# Patient Record
Sex: MALE | Race: WHITE
[De-identification: names, ages, dates, MRNs, and addresses within clinical notes are randomized per-mention and may not be internally consistent; named-entity substitution may affect disease eponyms.]

---

## 2018-08-24 ENCOUNTER — HOSPITAL ENCOUNTER (EMERGENCY)
Dept: HOSPITAL 41 - JD.ED | Age: 78
Discharge: HOME | End: 2018-08-24
Payer: MEDICARE

## 2018-08-24 DIAGNOSIS — F02.80: ICD-10-CM

## 2018-08-24 DIAGNOSIS — Z79.899: ICD-10-CM

## 2018-08-24 DIAGNOSIS — E03.9: ICD-10-CM

## 2018-08-24 DIAGNOSIS — I50.32: ICD-10-CM

## 2018-08-24 DIAGNOSIS — G31.83: ICD-10-CM

## 2018-08-24 DIAGNOSIS — R41.0: Primary | ICD-10-CM

## 2018-08-24 DIAGNOSIS — T43.595A: ICD-10-CM

## 2018-08-24 DIAGNOSIS — Z79.01: ICD-10-CM

## 2018-08-24 PROCEDURE — 70450 CT HEAD/BRAIN W/O DYE: CPT

## 2018-08-24 PROCEDURE — 80053 COMPREHEN METABOLIC PANEL: CPT

## 2018-08-24 PROCEDURE — 99285 EMERGENCY DEPT VISIT HI MDM: CPT

## 2018-08-24 PROCEDURE — 85610 PROTHROMBIN TIME: CPT

## 2018-08-24 PROCEDURE — 85027 COMPLETE CBC AUTOMATED: CPT

## 2018-08-24 PROCEDURE — 84484 ASSAY OF TROPONIN QUANT: CPT

## 2018-08-24 PROCEDURE — 85652 RBC SED RATE AUTOMATED: CPT

## 2018-08-24 PROCEDURE — 96361 HYDRATE IV INFUSION ADD-ON: CPT

## 2018-08-24 PROCEDURE — 82553 CREATINE MB FRACTION: CPT

## 2018-08-24 PROCEDURE — 81001 URINALYSIS AUTO W/SCOPE: CPT

## 2018-08-24 PROCEDURE — 83880 ASSAY OF NATRIURETIC PEPTIDE: CPT

## 2018-08-24 PROCEDURE — 84443 ASSAY THYROID STIM HORMONE: CPT

## 2018-08-24 PROCEDURE — 85007 BL SMEAR W/DIFF WBC COUNT: CPT

## 2018-08-24 PROCEDURE — 71045 X-RAY EXAM CHEST 1 VIEW: CPT

## 2018-08-24 PROCEDURE — 83735 ASSAY OF MAGNESIUM: CPT

## 2018-08-24 PROCEDURE — 87040 BLOOD CULTURE FOR BACTERIA: CPT

## 2018-08-24 PROCEDURE — 36415 COLL VENOUS BLD VENIPUNCTURE: CPT

## 2018-08-24 PROCEDURE — 96374 THER/PROPH/DIAG INJ IV PUSH: CPT

## 2018-08-24 PROCEDURE — 83605 ASSAY OF LACTIC ACID: CPT

## 2018-08-24 PROCEDURE — 86140 C-REACTIVE PROTEIN: CPT

## 2018-08-24 NOTE — CT
Head CT

 

Technique: Multiple axial sections through the brain were obtained.  

Intravenous contrast was not utilized.

 

Comparison: No prior intracranial imaging.

 

Findings: Ventricles along with basal cisterns and sulci over the 

convexities are moderately prominent.  Minimal diminished density is 

noted within the periventricular white matter which is felt compatible

 with small vessel ischemic demyelination change.  Atrophy is also 

noted within the cerebellum.  No other abnormal parenchymal densities 

are seen.  No evidence of intracranial hemorrhage.  No midline shift 

or mass effect is seen.

 

Bone window settings were obtained which shows moderate mucosal 

thickening left maxillary sinus and mild areas of mucosal thickening 

within the ethmoid sinuses.  No acute calvarial abnormality is seen.

 

Impression:

1.  Sinus findings which likely represent mild chronic sinusitis.

2.  Generalized atrophy as noted above.  Nothing acute is appreciated 

on noncontrast head CT exam.

 

Diagnostic code #2

## 2018-08-24 NOTE — EDM.PDOC
ED HPI GENERAL MEDICAL PROBLEM





- General


Chief Complaint: General


Stated Complaint: WEAKNESS/INCREASED CONFUSION


Time Seen by Provider: 08/24/18 07:27


Source of Information: Reports: Family


History Limitations: Reports: No Limitations





- History of Present Illness


INITIAL COMMENTS - FREE TEXT/NARRATIVE: 


77-year-old male brought to the ED by his wife and son this morning. They have 

appreciated increased generalized weakness and confusion over the last several 

weeks but much worse over the last week. Patient did have a tooth resected are 

excised earlier this week but was not put on any pain medication or 

antibiotics. Seemed to tolerate the procedure quite well and has been eating 

and drinking okay. They've appreciated him expressing declining in cognitive 

cognitive function and mobility over the last 2 months.  is been diagnosed 

with Lewy body dementia about 5 years ago. He has intermittent incontinence of 

stool and urine. Weight has been for the most part stable. Denies cough or 

sputum production. They believe he is taking adequate nutrition.


Onset: Gradual, Unknown/Unsure (Over the last 2 months but worse the last 3-4 

days.)


Duration: Week(s):, Chronic


Location: Reports: Generalized


Quality: Reports: Other (Generalized decline in cognitive function and seems to 

be more confused as of late particular last 3-4 days. Infusion disorientation)


Severity: Moderate (generalized weakness)


Improves with: Reports: None


Worsens with: Reports: None


Context: Reports: Other (Ration has Lewy body dementia. He had a tooth 

extracted 2 days ago).  Denies: Activity, Exercise, Lifting, Sick Contact, 

Trauma


Associated Symptoms: Reports: Confusion (More confused than normal), Loss of 

Appetite, Malaise, Weakness.  Denies: Chest Pain, Cough ( and seems to be 

declining level of function since that time.), cough w sputum, Diaphoresis, 

Fever/Chills, Headaches, Nausea/Vomiting, Rash, Seizure, Shortness of Breath, 

Syncope


Treatments PTA: Reports: Other (see below) (Generalized weakness no changes to 

medications.)





- Related Data


 Allergies











Allergy/AdvReac Type Severity Reaction Status Date / Time


 


No Known Allergies Allergy   Verified 08/24/18 07:18











Home Meds: 


 Home Meds





Allopurinol [Zyloprim] 50 mg PO DAILY 08/24/18 [History]


Furosemide 40 mg PO DAILY 08/24/18 [History]


Levothyroxine [Synthroid] 50 mcg PO ACBREAKFAST 08/24/18 [History]


Metoprolol Succinate [Toprol XL 100mg] 100 mg PO DAILY 08/24/18 [History]


Rivastigmine 1 each TD DAILY 08/24/18 [History]


Spironolactone [Aldactone] 25 mg PO DAILY #21 tab 08/24/18 [Rx]


Warfarin Sodium [Jantoven] 10 mg PO DAILY 08/24/18 [History]











Past Medical History


Cardiovascular History: Reports: Heart Failure, Pacemaker (With battery change 

about 2 years ago.)


Neurological History: Reports: Other (See Below) (Has Lewy body dementia.)


Endocrine/Metabolic History: Reports: Hypothyroidism





Social & Family History





- Tobacco Use


Smoking Status *Q: Never Smoker





- Caffeine Use


Caffeine Use: Reports: None





- Recreational Drug Use


Recreational Drug Use: No





- Living Situation & Occupation


Living situation: Reports: , with Family (Wife cares for him.)


Occupation: Retired





ED ROS GENERAL





- Review of Systems


Review Of Systems: See Below


Constitutional: Reports: Malaise, Weakness, Fatigue, Decreased Appetite, Weight 

Loss.  Denies: Fever


HEENT: Reports: No Symptoms


Respiratory: Reports: Other (Note O2 sats of 94% on room air).  Denies: 

Shortness of Breath, Wheezing, Pleuritic Chest Pain, Hemoptysis


Cardiovascular: Reports: No Symptoms, Blood Pressure Problem, Other (

Hypertension has a pacemaker).  Denies: Chest Pain


Endocrine: Reports: Fatigue


GI/Abdominal: Reports: Constipation (Location positive constipation), Decreased 

Appetite, Stool Incontinence (Occasionally.)


: Reports: Frequency (Occasional incontinence), Incontinence


Musculoskeletal: Reports: Back Pain, Joint Pain


Skin: Reports: Bruising


Neurological: Reports: Confusion (Bruises easily.), Trouble Speaking, 

Difficulty Walking, Weakness.  Denies: Syncope, Tremors, Change in Speech


Psychiatric: Reports: No Symptoms


Hematologic/Lymphatic: Reports: No Symptoms


Immunologic: Reports: No Symptoms





ED EXAM, GENERAL





- Physical Exam


Exam: See Below


Exam Limited By: Physical Impairment (Patient doesn't carry on much of a verbal 

conversation. He does comply with physical examination such as taking a deep 

breath opening his mouth etc.)


General Appearance: Alert, WD/WN, No Apparent Distress, Other (Does feel mildly 

warm to palpation.)


Eye Exam: Bilateral Eye: Normal Inspection


Ears: Normal TMs


Throat/Mouth: Normal Inspection, Normal Lips, Normal Oropharynx, Other


Head: Atraumatic (He has numerous teeth that are going to need to be removed in 

the near future.), Normocephalic


Neck: Normal Inspection, Supple, Non-Tender, Full Range of Motion.  No: 

Lymphadenopathy (L), Lymphadenopathy (R)


Respiratory/Chest: No Respiratory Distress, Lungs Clear, No Accessory Muscle Use

, Respiratory Distress (Mild tachypnea at rest.), Decreased Breath Sounds.  No: 

Rales, Wheezing


Cardiovascular: Regular Rate, Rhythm, No Edema, No Gallop, No Murmur (Decreased 

the lower 25% of lung fields posteriorly)


Peripheral Pulses: 1+: Posterior Tibial (L), Posterior Tibial (R), Dorsalis 

Pedis (L), Dorsalis Pedis (R)


GI/Abdominal: Normal Bowel Sounds, Soft, Non-Tender, No Organomegaly, No 

Abnormal Bruit, No Mass, Pelvis Stable


Extremities: Normal Inspection, Normal Range of Motion, Non-Tender, No Pedal 

Edema, Other (Some bruising dorsal hands and wrists)


Neurological: CN II-XII Intact, No Motor/Sensory Deficits, Disoriented (

Disorder to time and place.).  No: Oriented, Normal Cognition, Normal Gait, 

Normal Reflexes


Psychiatric: Flat Affect


Skin Exam: Warm, Dry, Intact, Normal Color, No Rash





Course





- Vital Signs


Last Recorded V/S: 


 Last Vital Signs











Temp  36.6 C   08/24/18 07:52


 


Pulse  68   08/24/18 07:15


 


Resp  20   08/24/18 07:15


 


BP  138/77   08/24/18 07:15


 


Pulse Ox  94 L  08/24/18 07:15














- Orders/Labs/Meds


Orders: 


 Active Orders 24 hr











 Category Date Time Status


 


 Chest 1V Frontal [CR] Stat Exams  08/24/18 07:35 Taken


 


 CULTURE BLOOD [BC] Stat Lab  08/24/18 07:50 Received


 


 CULTURE BLOOD [BC] Stat Lab  08/24/18 07:57 Received


 


 URINALYSIS W/MICROSCOPIC [UA W/MICROSCOPIC] [URIN] Stat Lab  08/24/18 08:00 

Ordered


 


 Dextrose 5%-0.9% NaCl [Dextrose 5%-Normal Saline] 1,000 Med  08/24/18 07:45 

Active





 ml   





 IV ASDIRECTED   


 


 Blood Culture x2 Reflex Set [OM.PC] Stat Oth  08/24/18 07:36 Ordered








 Medication Orders





Dextrose/Sodium Chloride (Dextrose 5%-Normal Saline)  1,000 mls @ 150 mls/hr IV 

ASDIRECTED JAKE


   Last Admin: 08/24/18 07:52  Dose: 150 mls/hr








Labs: 


 Laboratory Tests











  08/24/18 08/24/18 08/24/18 Range/Units





  07:50 07:50 07:50 


 


WBC  7.81    (4.23-9.07)  K/mm3


 


RBC  4.76    (4.63-6.08)  M/mm3


 


Hgb  15.0    (13.7-17.5)  gm/L


 


Hct  43.5    (40.1-51.0)  %


 


MCV  91.4    (79.0-92.2)  fl


 


MCH  31.5    (25.7-32.2)  pg


 


MCHC  34.5    (32.2-35.5)  g/dl


 


RDW Std Deviation  44.4 H    (35.1-43.9)  fL


 


Plt Count  182    (163-337)  K/mm3


 


MPV  8.7 L    (9.4-12.3)  fl


 


Neutrophils % (Manual)  75 H    (40-60)  %


 


Band Neutrophils %  0    (0-10)  %


 


Lymphocytes % (Manual)  24    (20-40)  %


 


Atypical Lymphs %  0    %


 


Monocytes % (Manual)  1 L    (2-10)  %


 


Eosinophils % (Manual)  0 L    (0.8-7.0)  %


 


Basophils % (Manual)  0 L    (0.2-1.2)  


 


Platelet Estimate  Adequate    


 


RBC Morph Comment  Normal    


 


ESR     (0-15)  mm/hr


 


PT   28.5 H   (9.5-12.1)  SECONDS


 


INR   2.67   


 


Sodium    143  (136-145)  mEq/L


 


Potassium    3.7  (3.5-5.1)  mEq/L


 


Chloride    105  ()  mEq/L


 


Carbon Dioxide    30  (21-32)  mEq/L


 


Anion Gap    11.7  (5-15)  


 


BUN    17  (7-18)  mg/dL


 


Creatinine    1.3  (0.7-1.3)  mg/dL


 


Est Cr Clr Drug Dosing    43.08  mL/min


 


Estimated GFR (MDRD)    53  (>60)  mL/min


 


BUN/Creatinine Ratio    13.1 L  (14-18)  


 


Glucose    110  ()  mg/dL


 


Lactic Acid     (0.4-2.0)  mmol/L


 


Calcium    9.1  (8.5-10.1)  mg/dL


 


Magnesium    1.8  (1.8-2.4)  mg/dl


 


Total Bilirubin    1.6 H  (0.2-1.0)  mg/dL


 


AST    25  (15-37)  U/L


 


ALT    24  (16-63)  U/L


 


Alkaline Phosphatase    102  ()  U/L


 


CK-MB (CK-2)    0.7  (0-3.6)  ng/ml


 


Troponin I    < 0.017  (0.00-0.056)  ng/mL


 


C-Reactive Protein    1.9 H*  (<1.0)  mg/dL


 


NT-Pro-B Natriuret Pep     (0-450)  pg/mL


 


Total Protein    7.2  (6.4-8.2)  g/dl


 


Albumin    3.7  (3.4-5.0)  g/dl


 


Globulin    3.5  gm/dL


 


Albumin/Globulin Ratio    1.1  (1-2)  


 


TSH 3rd Generation    2.504  (0.358-3.74)  uIU/mL


 


Urine Color     (Yellow)  


 


Urine Appearance     (Clear)  


 


Urine pH     (5.0-8.0)  


 


Ur Specific Gravity     (1.005-1.030)  


 


Urine Protein     (Negative)  


 


Urine Glucose (UA)     (Negative)  


 


Urine Ketones     (Negative)  


 


Urine Occult Blood     (Negative)  


 


Urine Nitrite     (Negative)  


 


Urine Bilirubin     (Negative)  


 


Urine Urobilinogen     (0.2-1.0)  


 


Ur Leukocyte Esterase     (Negative)  


 


Urine RBC     (0-5)  /hpf


 


Urine WBC     (0-5)  /hpf


 


Ur Epithelial Cells     (0-5)  /hpf


 


Urine Bacteria     (FEW)  /hpf


 


Urine Mucus     (FEW)  /hpf














  08/24/18 08/24/18 08/24/18 Range/Units





  07:50 07:50 07:50 


 


WBC     (4.23-9.07)  K/mm3


 


RBC     (4.63-6.08)  M/mm3


 


Hgb     (13.7-17.5)  gm/L


 


Hct     (40.1-51.0)  %


 


MCV     (79.0-92.2)  fl


 


MCH     (25.7-32.2)  pg


 


MCHC     (32.2-35.5)  g/dl


 


RDW Std Deviation     (35.1-43.9)  fL


 


Plt Count     (163-337)  K/mm3


 


MPV     (9.4-12.3)  fl


 


Neutrophils % (Manual)     (40-60)  %


 


Band Neutrophils %     (0-10)  %


 


Lymphocytes % (Manual)     (20-40)  %


 


Atypical Lymphs %     %


 


Monocytes % (Manual)     (2-10)  %


 


Eosinophils % (Manual)     (0.8-7.0)  %


 


Basophils % (Manual)     (0.2-1.2)  


 


Platelet Estimate     


 


RBC Morph Comment     


 


ESR  33 H    (0-15)  mm/hr


 


PT     (9.5-12.1)  SECONDS


 


INR     


 


Sodium     (136-145)  mEq/L


 


Potassium     (3.5-5.1)  mEq/L


 


Chloride     ()  mEq/L


 


Carbon Dioxide     (21-32)  mEq/L


 


Anion Gap     (5-15)  


 


BUN     (7-18)  mg/dL


 


Creatinine     (0.7-1.3)  mg/dL


 


Est Cr Clr Drug Dosing     mL/min


 


Estimated GFR (MDRD)     (>60)  mL/min


 


BUN/Creatinine Ratio     (14-18)  


 


Glucose     ()  mg/dL


 


Lactic Acid    0.9  (0.4-2.0)  mmol/L


 


Calcium     (8.5-10.1)  mg/dL


 


Magnesium     (1.8-2.4)  mg/dl


 


Total Bilirubin     (0.2-1.0)  mg/dL


 


AST     (15-37)  U/L


 


ALT     (16-63)  U/L


 


Alkaline Phosphatase     ()  U/L


 


CK-MB (CK-2)     (0-3.6)  ng/ml


 


Troponin I     (0.00-0.056)  ng/mL


 


C-Reactive Protein     (<1.0)  mg/dL


 


NT-Pro-B Natriuret Pep   1842 H   (0-450)  pg/mL


 


Total Protein     (6.4-8.2)  g/dl


 


Albumin     (3.4-5.0)  g/dl


 


Globulin     gm/dL


 


Albumin/Globulin Ratio     (1-2)  


 


TSH 3rd Generation     (0.358-3.74)  uIU/mL


 


Urine Color     (Yellow)  


 


Urine Appearance     (Clear)  


 


Urine pH     (5.0-8.0)  


 


Ur Specific Gravity     (1.005-1.030)  


 


Urine Protein     (Negative)  


 


Urine Glucose (UA)     (Negative)  


 


Urine Ketones     (Negative)  


 


Urine Occult Blood     (Negative)  


 


Urine Nitrite     (Negative)  


 


Urine Bilirubin     (Negative)  


 


Urine Urobilinogen     (0.2-1.0)  


 


Ur Leukocyte Esterase     (Negative)  


 


Urine RBC     (0-5)  /hpf


 


Urine WBC     (0-5)  /hpf


 


Ur Epithelial Cells     (0-5)  /hpf


 


Urine Bacteria     (FEW)  /hpf


 


Urine Mucus     (FEW)  /hpf














  08/24/18 Range/Units





  08:00 


 


WBC   (4.23-9.07)  K/mm3


 


RBC   (4.63-6.08)  M/mm3


 


Hgb   (13.7-17.5)  gm/L


 


Hct   (40.1-51.0)  %


 


MCV   (79.0-92.2)  fl


 


MCH   (25.7-32.2)  pg


 


MCHC   (32.2-35.5)  g/dl


 


RDW Std Deviation   (35.1-43.9)  fL


 


Plt Count   (163-337)  K/mm3


 


MPV   (9.4-12.3)  fl


 


Neutrophils % (Manual)   (40-60)  %


 


Band Neutrophils %   (0-10)  %


 


Lymphocytes % (Manual)   (20-40)  %


 


Atypical Lymphs %   %


 


Monocytes % (Manual)   (2-10)  %


 


Eosinophils % (Manual)   (0.8-7.0)  %


 


Basophils % (Manual)   (0.2-1.2)  


 


Platelet Estimate   


 


RBC Morph Comment   


 


ESR   (0-15)  mm/hr


 


PT   (9.5-12.1)  SECONDS


 


INR   


 


Sodium   (136-145)  mEq/L


 


Potassium   (3.5-5.1)  mEq/L


 


Chloride   ()  mEq/L


 


Carbon Dioxide   (21-32)  mEq/L


 


Anion Gap   (5-15)  


 


BUN   (7-18)  mg/dL


 


Creatinine   (0.7-1.3)  mg/dL


 


Est Cr Clr Drug Dosing   mL/min


 


Estimated GFR (MDRD)   (>60)  mL/min


 


BUN/Creatinine Ratio   (14-18)  


 


Glucose   ()  mg/dL


 


Lactic Acid   (0.4-2.0)  mmol/L


 


Calcium   (8.5-10.1)  mg/dL


 


Magnesium   (1.8-2.4)  mg/dl


 


Total Bilirubin   (0.2-1.0)  mg/dL


 


AST   (15-37)  U/L


 


ALT   (16-63)  U/L


 


Alkaline Phosphatase   ()  U/L


 


CK-MB (CK-2)   (0-3.6)  ng/ml


 


Troponin I   (0.00-0.056)  ng/mL


 


C-Reactive Protein   (<1.0)  mg/dL


 


NT-Pro-B Natriuret Pep   (0-450)  pg/mL


 


Total Protein   (6.4-8.2)  g/dl


 


Albumin   (3.4-5.0)  g/dl


 


Globulin   gm/dL


 


Albumin/Globulin Ratio   (1-2)  


 


TSH 3rd Generation   (0.358-3.74)  uIU/mL


 


Urine Color  Yellow  (Yellow)  


 


Urine Appearance  Clear  (Clear)  


 


Urine pH  7.0  (5.0-8.0)  


 


Ur Specific Gravity  1.020  (1.005-1.030)  


 


Urine Protein  Negative  (Negative)  


 


Urine Glucose (UA)  Negative  (Negative)  


 


Urine Ketones  Negative  (Negative)  


 


Urine Occult Blood  Trace-intact H  (Negative)  


 


Urine Nitrite  Negative  (Negative)  


 


Urine Bilirubin  Negative  (Negative)  


 


Urine Urobilinogen  0.2  (0.2-1.0)  


 


Ur Leukocyte Esterase  Negative  (Negative)  


 


Urine RBC  0-5  (0-5)  /hpf


 


Urine WBC  0-5  (0-5)  /hpf


 


Ur Epithelial Cells  0-5  (0-5)  /hpf


 


Urine Bacteria  Not seen  (FEW)  /hpf


 


Urine Mucus  Not seen  (FEW)  /hpf











Meds: 


Medications











Generic Name Dose Route Start Last Admin





  Trade Name Freq  PRN Reason Stop Dose Admin


 


Dextrose/Sodium Chloride  1,000 mls @ 150 mls/hr  08/24/18 07:45  08/24/18 07:52





  Dextrose 5%-Normal Saline  IV   150 mls/hr





  ASDIRECTED JAKE   Administration





     





     





     





     














Discontinued Medications














Generic Name Dose Route Start Last Admin





  Trade Name Freq  PRN Reason Stop Dose Admin


 


Acetaminophen  650 mg  08/24/18 07:35  08/24/18 07:52





  Tylenol  PO  08/24/18 07:36  650 mg





  NOW ONE   Administration





     





     





     





     


 


Furosemide  40 mg  08/24/18 09:24  08/24/18 09:30





  Lasix  IVPUSH  08/24/18 09:25  40 mg





  NOW ONE   Administration





     





     





     





     














- Radiology Interpretation


Free Text/Narrative:: 


77-year-old male with known widely body dementia presents the ED by family 

members with concerns about declining cognitive and physical state. He seems to 

be more confused disoriented and weak compared to his normal the last week or 

2. Seems to be worse since having an dental extraction carried out 2 days ago. 

He's on no pain medications or antibiotics. Clinically he does feel like he has 

a low-grade fever. He is nonverbal for the most part but does obey commands to 

allow examination. Is to make sure he does not have an underlying infective 

process. Routine labs to be done including portable chest x-ray and a 

catheterized urine specimens. Routine labs to include blood cultures 2.








- Re-Assessments/Exams


Free Text/Narrative Re-Assessment/Exam: 





08/24/18 08:19 chest x-ray done portably is rotated to the right. It shows 

moderate cardiomegaly. Slight hyperinflated lung fields with mild diffuse 

vascular congestion pattern. No pneumothorax or pleural effusions evident. No 

signs of an infective process either.





08/24/18 09:23 Labs are back showing a normal white count at 7.81. Differential 

is 75% neutrophils with no band cells reported. Hemoglobin is 15.0 with 

hematocrit of 43.5. Platelet count is 182,000. PT is 20.5 with an INR of 2.67. 

I.e. slightly supratherapeutic INR. Sodium is 143 with potassium of 3.7. 

Chloride 105 with a bicarbonate 30. Anion gap is normal 11.7. BUNs 17 with a 

creatinine of 1.3. Estimated GFR is 53. Glucose is 110 with a lactic acid of 

0.9. Calcium is 9.1 with magnesium of 1.8. Total bilirubin is 1.6. AST is 25 

with an ALT of 24. Alk phosphatase 102. CK-MB fraction is 0.7. Troponin I is 

less than 0.017. C-reactive protein is 1.9. BNP is elevated 1842.. Total 

protein is 7.2. TSH is normal at 2.50. Urinalysis is negative for any infection.





08/24/18 10:06 


upon further discussion with the wife she indicates that he is having much more 

trouble walking and a relating the last 2-3 days and she's concerned that he 

may have suffered a stroke. CT head will be done although not much is likely 

going to be admitted available to him in terms of improvement of this disorder 

is likely related to the Lewey body dementia.


08/24/18 11:29 had a long talk with the wife and watched him ambulate. He is 

exhibiting extrapyramidal symptoms. Drooling yesterday which is atypical for 

him walking with a shuffling gait and certainly has masked facies . Of note his 

neurologist placed him on 25 mg of Seroquel 2 weeks ago because of appreciated 

visual hallucinations. It seems to brought out extrapyramidal symptoms. After 

discussion with the neurologist last week the wife had reduce the dosage to 

12.5 mg a day and he still having significant difficulties. It is my suggestion 

at this time that they stopped the medication completely and likely stop the 

Namenda as well as it may be contributing to hallucinations. He is advanced 

degenerative disease and is unlikely to be getting much benefit from the 

Namenda and it can be causing some of the hallucinations. If is on board with 

these changes and therefore I'll have him follow-up with Dr. Fernandez in 2 

weeks time to see if he's better or worse.








Departure





- Departure


Time of Disposition: 11:31


Disposition: Home, Self-Care 01


Condition: Poor


Clinical Impression: 


 Severe dementia





Adverse effects of medication


Qualifiers:


 Encounter type: initial encounter Qualified Code(s): T50.905A - Adverse effect 

of unspecified drugs, medicaments and biological substances, initial encounter





Chronic congestive heart failure


Qualifiers:


 Heart failure type: diastolic Qualified Code(s): I50.32 - Chronic diastolic (

congestive) heart failure








- Discharge Information


*PRESCRIPTION DRUG MONITORING PROGRAM REVIEWED*: Not Applicable


*COPY OF PRESCRIPTION DRUG MONITORING REPORT IN PATIENT MARISA: Not Applicable


Prescriptions: 


Spironolactone [Aldactone] 25 mg PO DAILY #21 tab


Referrals: 


Eliezer Moran MD [Primary Care Provider] - 


Forms:  ED Department Discharge


Additional Instructions: 


Evaluation in the emergency room today in regards to increased problems with 

gait and increased confusion. Patient has known severe dementia secondary to E 

body disease. Laboratory evaluation suggests slight worsening of his congestive 

heart failure and he was given an extra dose of Lasix intravenously while in 

the ED today. I would suggest an increase in his Lasix to 40 mg in the morning 

and 20 mg between 2 and 3:00 in the afternoon daily. Also Aldactone 25 mg once 

daily in the morning to help the Lasix work better and safe potassium. In 

regards to the dementia illness I am highly suspicious that no medication 

Seroquel is contributing to extrapyramidal side effects which makes him look or 

parkinsonian. Also suggest because he is having hallucinations that Namenda 

which she's been on for a couple of years may be causing some of these side 

effects as well. Suggest discontinuing both of these medications at this time. 

Suggest follow-up with Dr. Moran in 10 days' time to assess his heart 

failure and his cognitive function off of the Seroquel and Namenda. The only 

new medication added today was Aldactone 25 mg once daily every morning. An 

increased dose of Lasix as described above.





- My Orders


Last 24 Hours: 


My Active Orders





08/24/18 07:35


Chest 1V Frontal [CR] Stat 





08/24/18 07:36


Blood Culture x2 Reflex Set [OM.PC] Stat 





08/24/18 07:45


Dextrose 5%-0.9% NaCl [Dextrose 5%-Normal Saline] 1,000 ml IV ASDIRECTED 





08/24/18 07:50


CULTURE BLOOD [BC] Stat 





08/24/18 07:57


CULTURE BLOOD [BC] Stat 





08/24/18 08:00


URINALYSIS W/MICROSCOPIC [UA W/MICROSCOPIC] [URIN] Stat 














- Assessment/Plan


Last 24 Hours: 


My Active Orders





08/24/18 07:35


Chest 1V Frontal [CR] Stat 





08/24/18 07:36


Blood Culture x2 Reflex Set [OM.PC] Stat 





08/24/18 07:45


Dextrose 5%-0.9% NaCl [Dextrose 5%-Normal Saline] 1,000 ml IV ASDIRECTED 





08/24/18 07:50


CULTURE BLOOD [BC] Stat 





08/24/18 07:57


CULTURE BLOOD [BC] Stat 





08/24/18 08:00


URINALYSIS W/MICROSCOPIC [UA W/MICROSCOPIC] [URIN] Stat

## 2018-08-26 ENCOUNTER — HOSPITAL ENCOUNTER (INPATIENT)
Dept: HOSPITAL 41 - JD.ED | Age: 78
LOS: 5 days | Discharge: NURSING, REHAB OR LONG-TERM CARE | DRG: 395 | End: 2018-08-31
Payer: MEDICARE

## 2018-08-26 DIAGNOSIS — Z79.01: ICD-10-CM

## 2018-08-26 DIAGNOSIS — R09.02: ICD-10-CM

## 2018-08-26 DIAGNOSIS — Z95.0: ICD-10-CM

## 2018-08-26 DIAGNOSIS — Z88.4: ICD-10-CM

## 2018-08-26 DIAGNOSIS — Z79.899: ICD-10-CM

## 2018-08-26 DIAGNOSIS — R32: ICD-10-CM

## 2018-08-26 DIAGNOSIS — Z85.89: ICD-10-CM

## 2018-08-26 DIAGNOSIS — K62.89: Primary | ICD-10-CM

## 2018-08-26 DIAGNOSIS — Z86.010: ICD-10-CM

## 2018-08-26 DIAGNOSIS — E03.9: ICD-10-CM

## 2018-08-26 DIAGNOSIS — R06.03: ICD-10-CM

## 2018-08-26 DIAGNOSIS — Z87.891: ICD-10-CM

## 2018-08-26 DIAGNOSIS — R19.7: ICD-10-CM

## 2018-08-26 DIAGNOSIS — N30.90: ICD-10-CM

## 2018-08-26 DIAGNOSIS — I50.9: ICD-10-CM

## 2018-08-26 DIAGNOSIS — R41.0: ICD-10-CM

## 2018-08-26 DIAGNOSIS — R26.2: ICD-10-CM

## 2018-08-26 DIAGNOSIS — I11.0: ICD-10-CM

## 2018-08-26 DIAGNOSIS — F02.80: ICD-10-CM

## 2018-08-26 DIAGNOSIS — M10.9: ICD-10-CM

## 2018-08-26 DIAGNOSIS — H54.7: ICD-10-CM

## 2018-08-26 DIAGNOSIS — Z66: ICD-10-CM

## 2018-08-26 DIAGNOSIS — G31.83: ICD-10-CM

## 2018-08-26 DIAGNOSIS — J18.1: ICD-10-CM

## 2018-08-26 PROCEDURE — 85007 BL SMEAR W/DIFF WBC COUNT: CPT

## 2018-08-26 PROCEDURE — 83605 ASSAY OF LACTIC ACID: CPT

## 2018-08-26 PROCEDURE — 85027 COMPLETE CBC AUTOMATED: CPT

## 2018-08-26 PROCEDURE — 93005 ELECTROCARDIOGRAM TRACING: CPT

## 2018-08-26 PROCEDURE — 96365 THER/PROPH/DIAG IV INF INIT: CPT

## 2018-08-26 PROCEDURE — 87040 BLOOD CULTURE FOR BACTERIA: CPT

## 2018-08-26 PROCEDURE — 81001 URINALYSIS AUTO W/SCOPE: CPT

## 2018-08-26 PROCEDURE — 83880 ASSAY OF NATRIURETIC PEPTIDE: CPT

## 2018-08-26 PROCEDURE — 71045 X-RAY EXAM CHEST 1 VIEW: CPT

## 2018-08-26 PROCEDURE — 99285 EMERGENCY DEPT VISIT HI MDM: CPT

## 2018-08-26 PROCEDURE — 86140 C-REACTIVE PROTEIN: CPT

## 2018-08-26 PROCEDURE — 36415 COLL VENOUS BLD VENIPUNCTURE: CPT

## 2018-08-26 PROCEDURE — 85652 RBC SED RATE AUTOMATED: CPT

## 2018-08-26 PROCEDURE — 80053 COMPREHEN METABOLIC PANEL: CPT

## 2018-08-27 RX ADMIN — Medication SCH EACH: at 16:12

## 2018-08-27 NOTE — PCM.HP
H&P History of Present Illness





- General


Date of Service: 08/27/18


Admit Problem/Dx: 


 Admission Diagnosis/Problem





Admission Diagnosis/Problem      Pneumonia








Source of Information: Patient, Family, Old Records, Provider, RN, RN Notes 

Reviewed


History Limitations: Reports: No Limitations





- Related Data


Allergies/Adverse Reactions: 


 Allergies











Allergy/AdvReac Type Severity Reaction Status Date / Time


 


procaine Allergy Unknown unknown Verified 08/27/18 03:28











Home Medications: 


 Home Meds





Allopurinol [Zyloprim] 50 mg PO DAILY 08/24/18 [History]


Furosemide 60 mg PO DAILY 08/24/18 [History]


Levothyroxine [Synthroid] 50 mcg PO ACBREAKFAST 08/24/18 [History]


Metoprolol Succinate [Toprol XL 100mg] 100 mg PO DAILY 08/24/18 [History]


Rivastigmine 1 each TD DAILY 08/24/18 [History]


Spironolactone [Aldactone] 25 mg PO DAILY #21 tab 08/24/18 [Rx]


Warfarin Sodium [Jantoven] 10 mg PO DAILY 08/27/18 [History]











Past Medical History


HEENT History: Reports: Impaired Vision


Other HEENT History: wears glasses


Cardiovascular History: Reports: Heart Failure, Pacemaker (With battery change 

about 2 years ago.)


Respiratory History: Reports: Asthma


Gastrointestinal History: Reports: Colon Polyp


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 

resection of bile duct. head of pancreas was removed or reconstructed. new bile 

duct is made out of intestinal tract


Genitourinary History: Reports: Other (See Below)


Other Genitourinary History: lately patient has had some incontinence issues


Musculoskeletal History: Reports: Gout


Neurological History: Reports: Other (See Below) (Has Lewy body dementia.)


Other Neuro History: lewie body dementia


Psychiatric History: Reports: Dementia


Endocrine/Metabolic History: Reports: Hypothyroidism


Hematologic History: Reports: Anticoagulation Therapy


Other Oncologic History: bile duct 10 years ago


Dermatologic History: Reports: Other (See Below)


Other Dermatologic History: discolored lower legs bilaterally, has had this for 

a long time





- Past Surgical History


HEENT Surgical History: Reports: Other (See Below)


Other HEENT Surgeries/Procedures: teeth pulled





Social & Family History





- Family History


Family Medical History: Noncontributory





- Tobacco Use


Smoking Status *Q: Former Smoker


Used Tobacco, but Quit: Yes


Month/Year Tobacco Last Used: 40 years ago





- Caffeine Use


Caffeine Use: Reports: None





- Recreational Drug Use


Recreational Drug Use: No





- Living Situation & Occupation


Living situation: Reports: , with Family (Wife cares for him.)


Occupation: Retired





Exam





- Vital Signs


Vital Signs: 


 Last Vital Signs











Temp  98.4 F   08/27/18 07:32


 


Pulse  59 L  08/27/18 07:32


 


Resp  20   08/27/18 07:32


 


BP  134/72   08/27/18 07:32


 


Pulse Ox  97   08/27/18 07:32











Weight: 186 lb 1.6 oz





- Patient Data


Lab Results Last 24 hrs: 


 Laboratory Results - last 24 hr











  08/27/18 08/27/18 08/27/18 Range/Units





  00:20 00:20 00:20 


 


WBC  9.30 H    (4.23-9.07)  K/mm3


 


RBC  4.70    (4.63-6.08)  M/mm3


 


Hgb  14.8    (13.7-17.5)  gm/L


 


Hct  43.3    (40.1-51.0)  %


 


MCV  92.1    (79.0-92.2)  fl


 


MCH  31.5    (25.7-32.2)  pg


 


MCHC  34.2    (32.2-35.5)  g/dl


 


RDW Std Deviation  45.5 H    (35.1-43.9)  fL


 


Plt Count  182    (163-337)  K/mm3


 


MPV  9.2 L    (9.4-12.3)  fl


 


Neut % (Auto)     (34.0-67.9)  %


 


Lymph % (Auto)     (21.8-53.1)  %


 


Mono % (Auto)     (5.3-12.2)  %


 


Eos % (Auto)     (0.8-7.0)  


 


Baso % (Auto)     (0.1-1.2)  %


 


Neut # (Auto)     (1.78-5.38)  K/mm3


 


Lymph # (Auto)     (1.32-3.57)  K/mm3


 


Mono # (Auto)     (0.30-0.82)  K/mm3


 


Eos # (Auto)     (0.04-0.54)  K/mm3


 


Baso # (Auto)     (0.01-0.08)  K/mm3


 


Neutrophils % (Manual)  87 H    (40-60)  %


 


Band Neutrophils %  0    (0-10)  %


 


Lymphocytes % (Manual)  7 L    (20-40)  %


 


Atypical Lymphs %  0    %


 


Monocytes % (Manual)  6    (2-10)  %


 


Eosinophils % (Manual)  0 L    (0.8-7.0)  %


 


Basophils % (Manual)  0 L    (0.2-1.2)  


 


Manual Slide Review     


 


Toxic Granulation  1+ slight    


 


Platelet Estimate  Adequate    


 


Plt Morphology Comment  Normal    


 


Anisocytosis  1+ slight    


 


Microcytosis  1+ slight    


 


Macrocytosis  1+ slight    


 


RBC Morph Comment  Abnormal    


 


ESR   33 H   (0-15)  mm/hr


 


PT     (9.5-12.1)  SECONDS


 


INR     


 


Sodium    141  (136-145)  mEq/L


 


Potassium    4.0  (3.5-5.1)  mEq/L


 


Chloride    104  ()  mEq/L


 


Carbon Dioxide    28  (21-32)  mEq/L


 


Anion Gap    13.0  (5-15)  


 


BUN    22 H  (7-18)  mg/dL


 


Creatinine    1.5 H  (0.7-1.3)  mg/dL


 


Est Cr Clr Drug Dosing    TNP  


 


Estimated GFR (MDRD)    45  (>60)  mL/min


 


BUN/Creatinine Ratio    14.7  (14-18)  


 


Glucose    141 H  ()  mg/dL


 


Lactic Acid     (0.4-2.0)  mmol/L


 


Calcium    8.9  (8.5-10.1)  mg/dL


 


Magnesium     (1.8-2.4)  mg/dl


 


Total Bilirubin    1.3 H  (0.2-1.0)  mg/dL


 


AST    68 H  (15-37)  U/L


 


ALT    60  (16-63)  U/L


 


Alkaline Phosphatase    127 H  ()  U/L


 


C-Reactive Protein    2.2 H*  (<1.0)  mg/dL


 


NT-Pro-B Natriuret Pep     (0-450)  pg/mL


 


Total Protein    7.2  (6.4-8.2)  g/dl


 


Albumin    3.5  (3.4-5.0)  g/dl


 


Globulin    3.7  gm/dL


 


Albumin/Globulin Ratio    1.0  (1-2)  


 


Urine Color     (Yellow)  


 


Urine Appearance     (Clear)  


 


Urine pH     (5.0-8.0)  


 


Ur Specific Gravity     (1.005-1.030)  


 


Urine Protein     (Negative)  


 


Urine Glucose (UA)     (Negative)  


 


Urine Ketones     (Negative)  


 


Urine Occult Blood     (Negative)  


 


Urine Nitrite     (Negative)  


 


Urine Bilirubin     (Negative)  


 


Urine Urobilinogen     (0.2-1.0)  


 


Ur Leukocyte Esterase     (Negative)  


 


Urine RBC     (0-5)  /hpf


 


Urine WBC     (0-5)  /hpf


 


Ur Epithelial Cells     (0-5)  /hpf


 


Urine Bacteria     (FEW)  /hpf


 


Hyaline Casts     (0-5)  /lpf


 


Urine Mucus     (FEW)  /hpf














  08/27/18 08/27/18 08/27/18 Range/Units





  00:20 00:20 00:39 


 


WBC     (4.23-9.07)  K/mm3


 


RBC     (4.63-6.08)  M/mm3


 


Hgb     (13.7-17.5)  gm/L


 


Hct     (40.1-51.0)  %


 


MCV     (79.0-92.2)  fl


 


MCH     (25.7-32.2)  pg


 


MCHC     (32.2-35.5)  g/dl


 


RDW Std Deviation     (35.1-43.9)  fL


 


Plt Count     (163-337)  K/mm3


 


MPV     (9.4-12.3)  fl


 


Neut % (Auto)     (34.0-67.9)  %


 


Lymph % (Auto)     (21.8-53.1)  %


 


Mono % (Auto)     (5.3-12.2)  %


 


Eos % (Auto)     (0.8-7.0)  


 


Baso % (Auto)     (0.1-1.2)  %


 


Neut # (Auto)     (1.78-5.38)  K/mm3


 


Lymph # (Auto)     (1.32-3.57)  K/mm3


 


Mono # (Auto)     (0.30-0.82)  K/mm3


 


Eos # (Auto)     (0.04-0.54)  K/mm3


 


Baso # (Auto)     (0.01-0.08)  K/mm3


 


Neutrophils % (Manual)     (40-60)  %


 


Band Neutrophils %     (0-10)  %


 


Lymphocytes % (Manual)     (20-40)  %


 


Atypical Lymphs %     %


 


Monocytes % (Manual)     (2-10)  %


 


Eosinophils % (Manual)     (0.8-7.0)  %


 


Basophils % (Manual)     (0.2-1.2)  


 


Manual Slide Review     


 


Toxic Granulation     


 


Platelet Estimate     


 


Plt Morphology Comment     


 


Anisocytosis     


 


Microcytosis     


 


Macrocytosis     


 


RBC Morph Comment     


 


ESR     (0-15)  mm/hr


 


PT     (9.5-12.1)  SECONDS


 


INR     


 


Sodium     (136-145)  mEq/L


 


Potassium     (3.5-5.1)  mEq/L


 


Chloride     ()  mEq/L


 


Carbon Dioxide     (21-32)  mEq/L


 


Anion Gap     (5-15)  


 


BUN     (7-18)  mg/dL


 


Creatinine     (0.7-1.3)  mg/dL


 


Est Cr Clr Drug Dosing     


 


Estimated GFR (MDRD)     (>60)  mL/min


 


BUN/Creatinine Ratio     (14-18)  


 


Glucose     ()  mg/dL


 


Lactic Acid   1.6   (0.4-2.0)  mmol/L


 


Calcium     (8.5-10.1)  mg/dL


 


Magnesium     (1.8-2.4)  mg/dl


 


Total Bilirubin     (0.2-1.0)  mg/dL


 


AST     (15-37)  U/L


 


ALT     (16-63)  U/L


 


Alkaline Phosphatase     ()  U/L


 


C-Reactive Protein     (<1.0)  mg/dL


 


NT-Pro-B Natriuret Pep  754 H    (0-450)  pg/mL


 


Total Protein     (6.4-8.2)  g/dl


 


Albumin     (3.4-5.0)  g/dl


 


Globulin     gm/dL


 


Albumin/Globulin Ratio     (1-2)  


 


Urine Color    Yellow  (Yellow)  


 


Urine Appearance    Clear  (Clear)  


 


Urine pH    6.0  (5.0-8.0)  


 


Ur Specific Gravity    > or = 1.030  (1.005-1.030)  


 


Urine Protein    1+ H  (Negative)  


 


Urine Glucose (UA)    Negative  (Negative)  


 


Urine Ketones    Negative  (Negative)  


 


Urine Occult Blood    Negative  (Negative)  


 


Urine Nitrite    Negative  (Negative)  


 


Urine Bilirubin    Negative  (Negative)  


 


Urine Urobilinogen    1.0  (0.2-1.0)  


 


Ur Leukocyte Esterase    Negative  (Negative)  


 


Urine RBC    0-5  (0-5)  /hpf


 


Urine WBC    0-5  (0-5)  /hpf


 


Ur Epithelial Cells    0-5  (0-5)  /hpf


 


Urine Bacteria    Rare  (FEW)  /hpf


 


Hyaline Casts    0-5  (0-5)  /lpf


 


Urine Mucus    Many H  (FEW)  /hpf














  08/27/18 08/27/18 08/27/18 Range/Units





  07:00 07:00 07:00 


 


WBC  9.15 H    (4.23-9.07)  K/mm3


 


RBC  4.39 L    (4.63-6.08)  M/mm3


 


Hgb  14.0    (13.7-17.5)  gm/L


 


Hct  41.0    (40.1-51.0)  %


 


MCV  93.4 H    (79.0-92.2)  fl


 


MCH  31.9    (25.7-32.2)  pg


 


MCHC  34.1    (32.2-35.5)  g/dl


 


RDW Std Deviation  45.4 H    (35.1-43.9)  fL


 


Plt Count  158 L    (163-337)  K/mm3


 


MPV  8.7 L    (9.4-12.3)  fl


 


Neut % (Auto)  80.5 H    (34.0-67.9)  %


 


Lymph % (Auto)  9.6 L    (21.8-53.1)  %


 


Mono % (Auto)  8.5    (5.3-12.2)  %


 


Eos % (Auto)  1.1    (0.8-7.0)  


 


Baso % (Auto)  0.1    (0.1-1.2)  %


 


Neut # (Auto)  7.36 H    (1.78-5.38)  K/mm3


 


Lymph # (Auto)  0.88 L    (1.32-3.57)  K/mm3


 


Mono # (Auto)  0.78    (0.30-0.82)  K/mm3


 


Eos # (Auto)  0.10    (0.04-0.54)  K/mm3


 


Baso # (Auto)  0.01    (0.01-0.08)  K/mm3


 


Neutrophils % (Manual)     (40-60)  %


 


Band Neutrophils %     (0-10)  %


 


Lymphocytes % (Manual)     (20-40)  %


 


Atypical Lymphs %     %


 


Monocytes % (Manual)     (2-10)  %


 


Eosinophils % (Manual)     (0.8-7.0)  %


 


Basophils % (Manual)     (0.2-1.2)  


 


Manual Slide Review  Abnormal smear    


 


Toxic Granulation     


 


Platelet Estimate     


 


Plt Morphology Comment     


 


Anisocytosis     


 


Microcytosis     


 


Macrocytosis     


 


RBC Morph Comment     


 


ESR     (0-15)  mm/hr


 


PT    38.5 H  (9.5-12.1)  SECONDS


 


INR    3.62  


 


Sodium   144   (136-145)  mEq/L


 


Potassium   4.0   (3.5-5.1)  mEq/L


 


Chloride   107   ()  mEq/L


 


Carbon Dioxide   30   (21-32)  mEq/L


 


Anion Gap   11.0   (5-15)  


 


BUN   22 H   (7-18)  mg/dL


 


Creatinine   1.4 H   (0.7-1.3)  mg/dL


 


Est Cr Clr Drug Dosing   40.00   


 


Estimated GFR (MDRD)   49   (>60)  mL/min


 


BUN/Creatinine Ratio   15.7   (14-18)  


 


Glucose   106   ()  mg/dL


 


Lactic Acid     (0.4-2.0)  mmol/L


 


Calcium   8.9   (8.5-10.1)  mg/dL


 


Magnesium   1.8   (1.8-2.4)  mg/dl


 


Total Bilirubin     (0.2-1.0)  mg/dL


 


AST     (15-37)  U/L


 


ALT     (16-63)  U/L


 


Alkaline Phosphatase     ()  U/L


 


C-Reactive Protein   3.5 H*   (<1.0)  mg/dL


 


NT-Pro-B Natriuret Pep     (0-450)  pg/mL


 


Total Protein     (6.4-8.2)  g/dl


 


Albumin     (3.4-5.0)  g/dl


 


Globulin     gm/dL


 


Albumin/Globulin Ratio     (1-2)  


 


Urine Color     (Yellow)  


 


Urine Appearance     (Clear)  


 


Urine pH     (5.0-8.0)  


 


Ur Specific Gravity     (1.005-1.030)  


 


Urine Protein     (Negative)  


 


Urine Glucose (UA)     (Negative)  


 


Urine Ketones     (Negative)  


 


Urine Occult Blood     (Negative)  


 


Urine Nitrite     (Negative)  


 


Urine Bilirubin     (Negative)  


 


Urine Urobilinogen     (0.2-1.0)  


 


Ur Leukocyte Esterase     (Negative)  


 


Urine RBC     (0-5)  /hpf


 


Urine WBC     (0-5)  /hpf


 


Ur Epithelial Cells     (0-5)  /hpf


 


Urine Bacteria     (FEW)  /hpf


 


Hyaline Casts     (0-5)  /lpf


 


Urine Mucus     (FEW)  /hpf











Result Diagrams: 


 08/27/18 07:00





 08/27/18 07:00


Orders Last 24hrs: 


 Active Orders 24 hr











 Category Date Time Status


 


 Admission Status [Patient Status] [ADT] Routine ADT  08/27/18 02:25 Active


 


 Activity as Tolerated [RC] .Routine Care  08/27/18 04:27 Active


 


 Height and Weight [RC] DAILY Care  08/27/18 10:47 Active


 


 Intake and Output [RC] QSHIFT Care  08/27/18 10:48 Active


 


 Nursing Bedside Swallow Screen [RC] ASDIRECTED Care  08/27/18 09:09 Active


 


 Oxygen Therapy [RC] PRN Care  08/27/18 10:47 Active


 


 Pulse Oximetry [RC] PRN Care  08/27/18 10:48 Active


 


 RT Aerosol Therapy [RC] ASDIRECTED Care  08/27/18 10:49 Active


 


 Up With Assistance [RC] ASDIRECTED Care  08/27/18 10:47 Active


 


 VTE/DVT Education [RC] PER UNIT ROUTINE Care  08/27/18 10:47 Active


 


 Vital Signs [RC] Q4HR Care  08/27/18 04:27 Active


 


 Consult to Case Management [CONS] Routine Cons  08/27/18 10:47 Active


 


 Consult to  [CONS] Routine Cons  08/27/18 10:47 Active


 


 Consult to Spiritual Care [CONS] Routine Cons  08/27/18 10:47 Active


 


 OT Evaluation and Treatment [CONS] Routine Cons  08/27/18 10:47 Active


 


 PT Evaluation and Treatment [CONS] Routine Cons  08/27/18 10:47 Active


 


 Respiratory Care Assess and Treatment [CONS] Routine Cons  08/27/18 10:47 

Active


 


 Heart Healthy Diet [DIET] Diet  08/27/18 Lunch Active


 


 BASIC METABOLIC PANEL,BMP [CHEM] AM Lab  08/28/18 05:11 Ordered


 


 BASIC METABOLIC PANEL,BMP [CHEM] AM Lab  08/29/18 05:11 Ordered


 


 BASIC METABOLIC PANEL,BMP [CHEM] AM Lab  08/30/18 05:11 Ordered


 


 BASIC METABOLIC PANEL,BMP [CHEM] AM Lab  08/31/18 05:11 Ordered


 


 CBC WITH AUTO DIFF [HEME] AM Lab  08/28/18 05:11 Ordered


 


 CBC WITH AUTO DIFF [HEME] AM Lab  08/29/18 05:11 Ordered


 


 CBC WITH AUTO DIFF [HEME] AM Lab  08/30/18 05:11 Ordered


 


 CBC WITH AUTO DIFF [HEME] AM Lab  08/31/18 05:11 Ordered


 


 CRP [C-REACTIVE PROTEIN] [CHEM] AM Lab  08/28/18 05:11 Ordered


 


 CRP [C-REACTIVE PROTEIN] [CHEM] AM Lab  08/29/18 05:11 Ordered


 


 CRP [C-REACTIVE PROTEIN] [CHEM] AM Lab  08/30/18 05:11 Ordered


 


 CRP [C-REACTIVE PROTEIN] [CHEM] AM Lab  08/31/18 05:11 Ordered


 


 CULTURE BLOOD [BC] Stat Lab  08/27/18 00:20 Received


 


 CULTURE BLOOD [BC] Stat Lab  08/27/18 00:33 Received


 


 INR,PT,PROTHROMBIN TIME [COAG] AM Lab  08/28/18 05:11 Ordered


 


 INR,PT,PROTHROMBIN TIME [COAG] AM Lab  08/29/18 05:11 Ordered


 


 INR,PT,PROTHROMBIN TIME [COAG] AM Lab  08/30/18 05:11 Ordered


 


 INR,PT,PROTHROMBIN TIME [COAG] AM Lab  08/31/18 05:11 Ordered


 


 MAGNESIUM [CHEM] AM Lab  08/28/18 05:11 Ordered


 


 MAGNESIUM [CHEM] AM Lab  08/29/18 05:11 Ordered


 


 MAGNESIUM [CHEM] AM Lab  08/30/18 05:11 Ordered


 


 MAGNESIUM [CHEM] AM Lab  08/31/18 05:11 Ordered


 


 UA W/MICROSCOPIC [URIN] Stat Lab  08/27/18 00:39 Ordered


 


 Acetaminophen [Tylenol] Med  08/27/18 10:47 Active





 650 mg PO Q4H PRN   


 


 Albuterol/Ipratropium [DuoNeb 3.0-0.5 MG/3 ML] Med  08/27/18 10:47 Active





 3 ml NEB Q4H PRN   


 


 Allopurinol [Zyloprim] Med  08/27/18 11:00 Active





 50 mg PO DAILY   


 


 Bisacodyl [Dulcolax] Med  08/27/18 10:47 Active





 5 mg PO DAILY PRN   


 


 Docusate Sodium [Colace] Med  08/27/18 10:47 Active





 100 mg PO BID PRN   


 


 Docusate Sodium/Sennosides [Senna Plus] Med  08/27/18 10:47 Active





 1 tab PO BID PRN   


 


 Furosemide [Lasix] Med  08/27/18 11:00 Active





 60 mg PO DAILY   


 


 Levothyroxine [Synthroid] Med  08/28/18 06:00 Active





 50 mcg PO ACBREAKFAST   


 


 Magnesium Rep Pharmacy to Dose [Pharmacy to Dose - Med  08/27/18 11:00 Active





 Magnesium Replacement]   





 1 dose .XX ASDIRECTED   


 


 Metoprolol Succinate [Toprol XL] Med  08/27/18 11:00 Active





 100 mg PO DAILY   


 


 Metoprolol Tartrate [Lopressor] Med  08/27/18 10:56 Active





 5 mg IVPUSH Q4H PRN   


 


 Ondansetron [Zofran ODT] Med  08/27/18 10:47 Active





 4 mg PO Q6H PRN   


 


 Ondansetron [Zofran] Med  08/27/18 10:47 Active





 4 mg IV Q6H PRN   


 


 Patient's Own Medication [Ptom] Med  08/27/18 11:00 Active





 0 each TOP DAILY   


 


 Polyethylene Glycol 3350 [MiraLAX] Med  08/27/18 10:47 Active





 17 gm PO DAILY PRN   


 


 Potassium Rep Pharmacy to Dose [Pharmacy to Dose - Med  08/27/18 11:00 Active





 Potassium Replacement]   





 1 dose .XX ASDIRECTED   


 


 Sodium Chloride 0.9% [Saline Flush] Med  08/27/18 04:27 Active





 10 ml FLUSH ASDIRECTED PRN   


 


 Spironolactone [Aldactone] Med  08/27/18 11:00 Active





 25 mg PO DAILY   


 


 Warfarin [Coumadin] Med  08/27/18 18:00 Active





 10 mg PO SuMoWeFrSa   


 


 Warfarin [Coumadin] Med  08/28/18 18:00 Active





 5 mg PO TuWe   


 


 cefTRIAXone [Rocephin] 1 gm Med  08/28/18 01:00 Active





 Sodium Chloride 0.9% [Normal Saline] 100 ml   





 IV Q24H   


 


 hydrALAZINE [Apresoline] Med  08/27/18 10:56 Active





 10 mg IVPUSH Q6H PRN   


 


 Blood Culture x2 Reflex Set [OM.PC] Stat Oth  08/27/18 00:00 Ordered


 


 Saline Lock Insert [OM.PC] Routine Oth  08/27/18 04:27 Ordered


 


 Resuscitation Status Routine Resus Stat  08/27/18 04:26 Ordered


 


 EKG 12 Lead [EK] Stat Ther  08/27/18 01:38 Ordered








 Medication Orders





Acetaminophen (Tylenol)  650 mg PO Q4H PRN


   PRN Reason: Pain (Mild 1-3)/fever


Albuterol/Ipratropium (Duoneb 3.0-0.5 Mg/3 Ml)  3 ml NEB Q4H PRN


   PRN Reason: Shortness Of Breath/wheezing


Allopurinol (Zyloprim)  [facility]ILY [facility]


   Last Admin: 08/27/18 11:26  Dose: 50 mg


Bisacodyl (Dulcolax)  5 mg PO DAILY PRN


   PRN Reason: Constipation


Docusate Sodium (Colace)  100 mg PO BID PRN


   PRN Reason: Constipation


Furosemide (Lasix)  60 mg PO DAILY [facility]


   Last Admin: 08/27/18 11:26  Dose: 60 mg


Hydralazine HCl (Apresoline)  10 mg IVPUSH Q6H PRN


   PRN Reason: Hypertension


Ceftriaxone Sodium 1 gm/ (Sodium Chloride)  100 mls @ 200 mls/hr IV Q24H [facility]


Levothyroxine Sodium (Synthroid)  50 mcg PO ACBREAKFAST [facility]


Magnesium Sulfate (Pharmacy To Dose - Magnesium Replacement)  1 dose .XX 

ASDIRECTED [facility]


Metoprolol Succinate (Toprol Xl)  100 mg PO DAILY [facility]


   Last Admin: 08/27/18 11:27  Dose:  


Metoprolol Tartrate (Lopressor)  5 mg IVPUSH Q4H PRN


   PRN Reason: Tachycardia


Ondansetron HCl (Zofran Odt)  4 mg PO Q6H PRN


   PRN Reason: nausea, able to take PO


Ondansetron HCl (Zofran)  4 mg IV Q6H PRN


   PRN Reason: Nausea/Vomiting


Rivastigmine [ (Rivastigmine] 1 Each)  0 each TOP DAILY [facility]


Polyethylene Glycol (Miralax)  17 gm PO DAILY PRN


   PRN Reason: Constipation


Potassium Chloride (Pharmacy To Dose - Potassium Replacement)  1 dose .XX 

ASDIRECTED [facility]


Senna/Docusate Sodium (Senna Plus)  1 tab PO BID PRN


   PRN Reason: Constipation


Sodium Chloride (Saline Flush)  10 ml FLUSH ASDIRECTED PRN


   PRN Reason: Keep Vein Open


Spironolactone (Aldactone)  25 mg PO DAILY [facility]


   Last Admin: 08/27/18 11:26  Dose: 25 mg


Warfarin Sodium (Coumadin)  10 mg PO SuMoWeFrSa [facility]


Warfarin Sodium (Coumadin)  5 mg PO TuWe [facility]

## 2018-08-27 NOTE — CR
Chest: Portable view of the chest was obtained.

 

Comparison: Prior chest x-ray of 08/24/18.

 

Heart size is normal.  Slight tortuosity of the thoracic aortic is 

seen.  Pacemaker is seen.  Lungs are clear without acute parenchymal 

change.  Bony structures are grossly intact.

 

Impression:

1.  Nothing acute is seen on portable chest x-ray.

 

Diagnostic code #2

## 2018-08-27 NOTE — PCM.HP
H&P History of Present Illness





- General


Date of Service: 08/27/18


Admit Problem/Dx: 


 Admission Diagnosis/Problem





Admission Diagnosis/Problem      Pneumonia








Source of Information: Old Records, Provider


History Limitations: Reports: Altered Mental Status (Confused)





- History of Present Illness


Initial Comments - Free Text/Narative: 


This is an 78 yo male with past medical h/o CHF, pacemaker, Lewy Body Dementia, 

gout, impaired vision, hypothyroid, h/o bile duct CA s/p Whipple, 

anticoagulation therapy who comes in for fever of unknown origin and dyspnea. 

He was seen on 8/24 in the ED after getting a tooth pulled and he had increased 

confusion. ER doctor changed his medication to help lessen the confusion. 

Seroquel and Namenda were stopped, Lasix was increased from 40 to 60, and 

Sprinolactone was added.   No current pain. He denies F/C, SOB, cough, nausea, 

vomiting, diarrhea, chest pain, or GI/ complaints.





His symptoms improved after receiving O2 and antibiotics in the ED. His initial 

workup in the ED showed a CBC remarkable for WBC 9.3, RDW 45.5, MPV 9.2, Neut 87

% with no bandemia, Lymph 7%, ESR 33. His coagulation study shows PT 38.5, INR 

3.62. His chemistry is remarkable for BUN 22, Cr 1.5, GFR 45, Glu 141, Bili 1.3

, AST 68, Alk Phos 127, CRP 2.2, . U/A unimpressive for UTI. CXR read by 

ER physician as RUL PNA; formal read shows no acute abnormalities. 





He is subsequently admitted to the medical floor. He is Full code. His PCP is 

Dr. Moran.





- Related Data


Allergies/Adverse Reactions: 


 Allergies











Allergy/AdvReac Type Severity Reaction Status Date / Time


 


procaine Allergy Unknown unknown Verified 08/27/18 03:28











Home Medications: 


 Home Meds





Allopurinol [Zyloprim] 50 mg PO DAILY 08/24/18 [History]


Furosemide 60 mg PO DAILY 08/24/18 [History]


Levothyroxine [Synthroid] 50 mcg PO ACBREAKFAST 08/24/18 [History]


Metoprolol Succinate [Toprol XL 100mg] 100 mg PO DAILY 08/24/18 [History]


Rivastigmine 1 each TD DAILY 08/24/18 [History]


Spironolactone [Aldactone] 25 mg PO DAILY #21 tab 08/24/18 [Rx]


Warfarin Sodium [Jantoven] 10 mg PO DAILY 08/27/18 [History]











Past Medical History


HEENT History: Reports: Impaired Vision


Other HEENT History: wears glasses


Cardiovascular History: Reports: Heart Failure, Pacemaker (With battery change 

about 2 years ago.)


Respiratory History: Reports: Asthma


Gastrointestinal History: Reports: Colon Polyp


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 

resection of bile duct. head of pancreas was removed or reconstructed. new bile 

duct is made out of intestinal tract


Genitourinary History: Reports: Other (See Below)


Other Genitourinary History: lately patient has had some incontinence issues


Musculoskeletal History: Reports: Gout


Neurological History: Reports: Other (See Below) (Has Lewy body dementia.)


Other Neuro History: lewie body dementia


Psychiatric History: Reports: Dementia


Endocrine/Metabolic History: Reports: Hypothyroidism


Hematologic History: Reports: Anticoagulation Therapy


Other Oncologic History: bile duct 10 years ago


Dermatologic History: Reports: Other (See Below)


Other Dermatologic History: discolored lower legs bilaterally, has had this for 

a long time





- Past Surgical History


HEENT Surgical History: Reports: Other (See Below)


Other HEENT Surgeries/Procedures: teeth pulled





Social & Family History





- Family History


Family Medical History: Noncontributory





- Tobacco Use


Smoking Status *Q: Former Smoker


Used Tobacco, but Quit: Yes


Month/Year Tobacco Last Used: 40 years ago





- Caffeine Use


Caffeine Use: Reports: None





- Recreational Drug Use


Recreational Drug Use: No





- Living Situation & Occupation


Living situation: Reports: , with Family (Wife cares for him.)


Occupation: Retired





H&P Review of Systems





- Review of Systems:


Review Of Systems: See Below


Free Text/Narrative: 


Pt is confused when I talk to him, A+O x2, disoriented to time and situation. 

No family to talk to right now. He currently has no complaints. 


General: Reports: No Symptoms.  Denies: Fever, Chills


HEENT: Reports: Glasses


Pulmonary: Reports: No Symptoms.  Denies: Shortness of Breath, Cough


Cardiovascular: Reports: No Symptoms.  Denies: Chest Pain


Gastrointestinal: Reports: No Symptoms.  Denies: Abdominal Pain, Diarrhea, 

Nausea, Vomiting


Genitourinary: Reports: No Symptoms.  Denies: Dysuria, Frequency, Burning, Pain

, Urgency


Musculoskeletal: Reports: No Symptoms


Skin: Reports: No Symptoms


Psychiatric: Reports: Confusion (A+O x 2, disoriented to time and situation. 

Was trying to pull out his IV when I came into the room. )


Neurological: Reports: Confusion (A+O x 2, disoriented to time and situation. 

Was trying to pull out his IV when I came into the room. )


Hematologic/Lymphatic: Reports: No Symptoms


Immunologic: Reports: No Symptoms





Exam





- Exam


Exam: See Below





- Vital Signs


Vital Signs: 


 Last Vital Signs











Temp  99.0 F   08/27/18 15:59


 


Pulse  62   08/27/18 15:59


 


Resp  20   08/27/18 15:59


 


BP  139/73   08/27/18 15:59


 


Pulse Ox  97   08/27/18 15:59











Weight: 186 lb 1.6 oz





- Exam


Quality Assessment: DVT Prophylaxis.  No: Supplemental Oxygen


General: Alert, Oriented (x2 person and place only. Disoriented to time and 

situation. ), Cooperative


HEENT: PERRLA, Hearing Intact, Mucosa Moist & Pink, Nares Patent, Normal Nasal 

Septum, Posterior Pharynx Clear, Conjunctiva Clear, EOMI, EACs Clear, TMs Clear


Neck: Supple, Trachea Midline, 2


Lungs: Clear to Auscultation, Normal Respiratory Effort


Cardiovascular: Regular Rate, Regular Rhythm


GI/Abdominal Exam: Normal Bowel Sounds, Soft, Non-Tender, No Organomegaly, No 

Distention, No Abnormal Bruit, No Mass, Pelvis Stable


 (Male) Exam: Deferred


Rectal (Males) Exam: Deferred


Back Exam: Normal Inspection, Full Range of Motion, NT


Extremities: Normal Inspection, Normal Range of Motion, Non-Tender, No Pedal 

Edema, Normal Capillary Refill


Peripheral Pulses: 2+: Posterior Tibial (L), Posterior Tibial (R), Dorsalis 

Pedis (L), Dorsalis Pedis (R)


Skin: Warm, Dry, Intact


Neurological: Cranial Nerves Intact (grossly)


Neuro Extensive - Mental Status: Alert, Disorientation to Time (and situation).

  No: Disorientation to Person, Disorientation to Place


Psychiatric: Alert, Other (Confused)





- Patient Data


Lab Results Last 24 hrs: 


 Laboratory Results - last 24 hr











  08/27/18 08/27/18 08/27/18 Range/Units





  00:20 00:20 00:20 


 


WBC  9.30 H    (4.23-9.07)  K/mm3


 


RBC  4.70    (4.63-6.08)  M/mm3


 


Hgb  14.8    (13.7-17.5)  gm/L


 


Hct  43.3    (40.1-51.0)  %


 


MCV  92.1    (79.0-92.2)  fl


 


MCH  31.5    (25.7-32.2)  pg


 


MCHC  34.2    (32.2-35.5)  g/dl


 


RDW Std Deviation  45.5 H    (35.1-43.9)  fL


 


Plt Count  182    (163-337)  K/mm3


 


MPV  9.2 L    (9.4-12.3)  fl


 


Neut % (Auto)     (34.0-67.9)  %


 


Lymph % (Auto)     (21.8-53.1)  %


 


Mono % (Auto)     (5.3-12.2)  %


 


Eos % (Auto)     (0.8-7.0)  


 


Baso % (Auto)     (0.1-1.2)  %


 


Neut # (Auto)     (1.78-5.38)  K/mm3


 


Lymph # (Auto)     (1.32-3.57)  K/mm3


 


Mono # (Auto)     (0.30-0.82)  K/mm3


 


Eos # (Auto)     (0.04-0.54)  K/mm3


 


Baso # (Auto)     (0.01-0.08)  K/mm3


 


Neutrophils % (Manual)  87 H    (40-60)  %


 


Band Neutrophils %  0    (0-10)  %


 


Lymphocytes % (Manual)  7 L    (20-40)  %


 


Atypical Lymphs %  0    %


 


Monocytes % (Manual)  6    (2-10)  %


 


Eosinophils % (Manual)  0 L    (0.8-7.0)  %


 


Basophils % (Manual)  0 L    (0.2-1.2)  


 


Manual Slide Review     


 


Toxic Granulation  1+ slight    


 


Platelet Estimate  Adequate    


 


Plt Morphology Comment  Normal    


 


Anisocytosis  1+ slight    


 


Microcytosis  1+ slight    


 


Macrocytosis  1+ slight    


 


RBC Morph Comment  Abnormal    


 


ESR   33 H   (0-15)  mm/hr


 


PT     (9.5-12.1)  SECONDS


 


INR     


 


Sodium    141  (136-145)  mEq/L


 


Potassium    4.0  (3.5-5.1)  mEq/L


 


Chloride    104  ()  mEq/L


 


Carbon Dioxide    28  (21-32)  mEq/L


 


Anion Gap    13.0  (5-15)  


 


BUN    22 H  (7-18)  mg/dL


 


Creatinine    1.5 H  (0.7-1.3)  mg/dL


 


Est Cr Clr Drug Dosing    TNP  


 


Estimated GFR (MDRD)    45  (>60)  mL/min


 


BUN/Creatinine Ratio    14.7  (14-18)  


 


Glucose    141 H  ()  mg/dL


 


Lactic Acid     (0.4-2.0)  mmol/L


 


Calcium    8.9  (8.5-10.1)  mg/dL


 


Magnesium     (1.8-2.4)  mg/dl


 


Total Bilirubin    1.3 H  (0.2-1.0)  mg/dL


 


AST    68 H  (15-37)  U/L


 


ALT    60  (16-63)  U/L


 


Alkaline Phosphatase    127 H  ()  U/L


 


C-Reactive Protein    2.2 H*  (<1.0)  mg/dL


 


NT-Pro-B Natriuret Pep     (0-450)  pg/mL


 


Total Protein    7.2  (6.4-8.2)  g/dl


 


Albumin    3.5  (3.4-5.0)  g/dl


 


Globulin    3.7  gm/dL


 


Albumin/Globulin Ratio    1.0  (1-2)  


 


Urine Color     (Yellow)  


 


Urine Appearance     (Clear)  


 


Urine pH     (5.0-8.0)  


 


Ur Specific Gravity     (1.005-1.030)  


 


Urine Protein     (Negative)  


 


Urine Glucose (UA)     (Negative)  


 


Urine Ketones     (Negative)  


 


Urine Occult Blood     (Negative)  


 


Urine Nitrite     (Negative)  


 


Urine Bilirubin     (Negative)  


 


Urine Urobilinogen     (0.2-1.0)  


 


Ur Leukocyte Esterase     (Negative)  


 


Urine RBC     (0-5)  /hpf


 


Urine WBC     (0-5)  /hpf


 


Ur Epithelial Cells     (0-5)  /hpf


 


Urine Bacteria     (FEW)  /hpf


 


Hyaline Casts     (0-5)  /lpf


 


Urine Mucus     (FEW)  /hpf














  08/27/18 08/27/18 08/27/18 Range/Units





  00:20 00:20 00:39 


 


WBC     (4.23-9.07)  K/mm3


 


RBC     (4.63-6.08)  M/mm3


 


Hgb     (13.7-17.5)  gm/L


 


Hct     (40.1-51.0)  %


 


MCV     (79.0-92.2)  fl


 


MCH     (25.7-32.2)  pg


 


MCHC     (32.2-35.5)  g/dl


 


RDW Std Deviation     (35.1-43.9)  fL


 


Plt Count     (163-337)  K/mm3


 


MPV     (9.4-12.3)  fl


 


Neut % (Auto)     (34.0-67.9)  %


 


Lymph % (Auto)     (21.8-53.1)  %


 


Mono % (Auto)     (5.3-12.2)  %


 


Eos % (Auto)     (0.8-7.0)  


 


Baso % (Auto)     (0.1-1.2)  %


 


Neut # (Auto)     (1.78-5.38)  K/mm3


 


Lymph # (Auto)     (1.32-3.57)  K/mm3


 


Mono # (Auto)     (0.30-0.82)  K/mm3


 


Eos # (Auto)     (0.04-0.54)  K/mm3


 


Baso # (Auto)     (0.01-0.08)  K/mm3


 


Neutrophils % (Manual)     (40-60)  %


 


Band Neutrophils %     (0-10)  %


 


Lymphocytes % (Manual)     (20-40)  %


 


Atypical Lymphs %     %


 


Monocytes % (Manual)     (2-10)  %


 


Eosinophils % (Manual)     (0.8-7.0)  %


 


Basophils % (Manual)     (0.2-1.2)  


 


Manual Slide Review     


 


Toxic Granulation     


 


Platelet Estimate     


 


Plt Morphology Comment     


 


Anisocytosis     


 


Microcytosis     


 


Macrocytosis     


 


RBC Morph Comment     


 


ESR     (0-15)  mm/hr


 


PT     (9.5-12.1)  SECONDS


 


INR     


 


Sodium     (136-145)  mEq/L


 


Potassium     (3.5-5.1)  mEq/L


 


Chloride     ()  mEq/L


 


Carbon Dioxide     (21-32)  mEq/L


 


Anion Gap     (5-15)  


 


BUN     (7-18)  mg/dL


 


Creatinine     (0.7-1.3)  mg/dL


 


Est Cr Clr Drug Dosing     


 


Estimated GFR (MDRD)     (>60)  mL/min


 


BUN/Creatinine Ratio     (14-18)  


 


Glucose     ()  mg/dL


 


Lactic Acid   1.6   (0.4-2.0)  mmol/L


 


Calcium     (8.5-10.1)  mg/dL


 


Magnesium     (1.8-2.4)  mg/dl


 


Total Bilirubin     (0.2-1.0)  mg/dL


 


AST     (15-37)  U/L


 


ALT     (16-63)  U/L


 


Alkaline Phosphatase     ()  U/L


 


C-Reactive Protein     (<1.0)  mg/dL


 


NT-Pro-B Natriuret Pep  754 H    (0-450)  pg/mL


 


Total Protein     (6.4-8.2)  g/dl


 


Albumin     (3.4-5.0)  g/dl


 


Globulin     gm/dL


 


Albumin/Globulin Ratio     (1-2)  


 


Urine Color    Yellow  (Yellow)  


 


Urine Appearance    Clear  (Clear)  


 


Urine pH    6.0  (5.0-8.0)  


 


Ur Specific Gravity    > or = 1.030  (1.005-1.030)  


 


Urine Protein    1+ H  (Negative)  


 


Urine Glucose (UA)    Negative  (Negative)  


 


Urine Ketones    Negative  (Negative)  


 


Urine Occult Blood    Negative  (Negative)  


 


Urine Nitrite    Negative  (Negative)  


 


Urine Bilirubin    Negative  (Negative)  


 


Urine Urobilinogen    1.0  (0.2-1.0)  


 


Ur Leukocyte Esterase    Negative  (Negative)  


 


Urine RBC    0-5  (0-5)  /hpf


 


Urine WBC    0-5  (0-5)  /hpf


 


Ur Epithelial Cells    0-5  (0-5)  /hpf


 


Urine Bacteria    Rare  (FEW)  /hpf


 


Hyaline Casts    0-5  (0-5)  /lpf


 


Urine Mucus    Many H  (FEW)  /hpf














  08/27/18 08/27/18 08/27/18 Range/Units





  07:00 07:00 07:00 


 


WBC  9.15 H    (4.23-9.07)  K/mm3


 


RBC  4.39 L    (4.63-6.08)  M/mm3


 


Hgb  14.0    (13.7-17.5)  gm/L


 


Hct  41.0    (40.1-51.0)  %


 


MCV  93.4 H    (79.0-92.2)  fl


 


MCH  31.9    (25.7-32.2)  pg


 


MCHC  34.1    (32.2-35.5)  g/dl


 


RDW Std Deviation  45.4 H    (35.1-43.9)  fL


 


Plt Count  158 L    (163-337)  K/mm3


 


MPV  8.7 L    (9.4-12.3)  fl


 


Neut % (Auto)  80.5 H    (34.0-67.9)  %


 


Lymph % (Auto)  9.6 L    (21.8-53.1)  %


 


Mono % (Auto)  8.5    (5.3-12.2)  %


 


Eos % (Auto)  1.1    (0.8-7.0)  


 


Baso % (Auto)  0.1    (0.1-1.2)  %


 


Neut # (Auto)  7.36 H    (1.78-5.38)  K/mm3


 


Lymph # (Auto)  0.88 L    (1.32-3.57)  K/mm3


 


Mono # (Auto)  0.78    (0.30-0.82)  K/mm3


 


Eos # (Auto)  0.10    (0.04-0.54)  K/mm3


 


Baso # (Auto)  0.01    (0.01-0.08)  K/mm3


 


Neutrophils % (Manual)     (40-60)  %


 


Band Neutrophils %     (0-10)  %


 


Lymphocytes % (Manual)     (20-40)  %


 


Atypical Lymphs %     %


 


Monocytes % (Manual)     (2-10)  %


 


Eosinophils % (Manual)     (0.8-7.0)  %


 


Basophils % (Manual)     (0.2-1.2)  


 


Manual Slide Review  Abnormal smear    


 


Toxic Granulation     


 


Platelet Estimate     


 


Plt Morphology Comment     


 


Anisocytosis     


 


Microcytosis     


 


Macrocytosis     


 


RBC Morph Comment     


 


ESR     (0-15)  mm/hr


 


PT    38.5 H  (9.5-12.1)  SECONDS


 


INR    3.62  


 


Sodium   144   (136-145)  mEq/L


 


Potassium   4.0   (3.5-5.1)  mEq/L


 


Chloride   107   ()  mEq/L


 


Carbon Dioxide   30   (21-32)  mEq/L


 


Anion Gap   11.0   (5-15)  


 


BUN   22 H   (7-18)  mg/dL


 


Creatinine   1.4 H   (0.7-1.3)  mg/dL


 


Est Cr Clr Drug Dosing   40.00   


 


Estimated GFR (MDRD)   49   (>60)  mL/min


 


BUN/Creatinine Ratio   15.7   (14-18)  


 


Glucose   106   ()  mg/dL


 


Lactic Acid     (0.4-2.0)  mmol/L


 


Calcium   8.9   (8.5-10.1)  mg/dL


 


Magnesium   1.8   (1.8-2.4)  mg/dl


 


Total Bilirubin     (0.2-1.0)  mg/dL


 


AST     (15-37)  U/L


 


ALT     (16-63)  U/L


 


Alkaline Phosphatase     ()  U/L


 


C-Reactive Protein   3.5 H*   (<1.0)  mg/dL


 


NT-Pro-B Natriuret Pep     (0-450)  pg/mL


 


Total Protein     (6.4-8.2)  g/dl


 


Albumin     (3.4-5.0)  g/dl


 


Globulin     gm/dL


 


Albumin/Globulin Ratio     (1-2)  


 


Urine Color     (Yellow)  


 


Urine Appearance     (Clear)  


 


Urine pH     (5.0-8.0)  


 


Ur Specific Gravity     (1.005-1.030)  


 


Urine Protein     (Negative)  


 


Urine Glucose (UA)     (Negative)  


 


Urine Ketones     (Negative)  


 


Urine Occult Blood     (Negative)  


 


Urine Nitrite     (Negative)  


 


Urine Bilirubin     (Negative)  


 


Urine Urobilinogen     (0.2-1.0)  


 


Ur Leukocyte Esterase     (Negative)  


 


Urine RBC     (0-5)  /hpf


 


Urine WBC     (0-5)  /hpf


 


Ur Epithelial Cells     (0-5)  /hpf


 


Urine Bacteria     (FEW)  /hpf


 


Hyaline Casts     (0-5)  /lpf


 


Urine Mucus     (FEW)  /hpf











Result Diagrams: 


 08/27/18 07:00





 08/27/18 07:00


Jose Results Last 24 hrs: 


 Microbiology











 08/27/18 00:33 Anaerobic Blood Culture - Preliminary





 Blood - Venous - Lab Draw    Gram Negative Rods














- Problem List


(1) Fever, unknown origin


SNOMED Code(s): 9912476


   ICD Code: R50.9 - FEVER, UNSPECIFIED   Status: Acute   Priority: High   

Current Visit: Yes   





(2) Hypoxia


SNOMED Code(s): 635284012


   ICD Code: R09.02 - HYPOXEMIA   Status: Resolved   Priority: High   Current 

Visit: No   





(3) Severe dementia


SNOMED Code(s): 92132786


   ICD Code: F03.90 - UNSPECIFIED DEMENTIA WITHOUT BEHAVIORAL DISTURBANCE   

Status: Chronic   Priority: Low   Current Visit: Yes   


Problem List Initiated/Reviewed/Updated: Yes


Orders Last 24hrs: 


 Active Orders 24 hr











 Category Date Time Status


 


 Admission Status [Patient Status] [ADT] Routine ADT  08/27/18 02:25 Active


 


 Activity as Tolerated [RC] BID Care  08/27/18 04:27 Active


 


 Height and Weight [RC] DAILY Care  08/27/18 10:47 Active


 


 Intake and Output [RC] QSHIFT Care  08/27/18 10:48 Active


 


 Nursing Bedside Swallow Screen [RC] 1600 Care  08/27/18 09:09 Active


 


 RT Aerosol Therapy [RC] ASDIRECTED Care  08/27/18 10:49 Active


 


 Up With Assistance [RC] BID Care  08/27/18 10:47 Active


 


 VTE/DVT Education [RC] PER UNIT ROUTINE Care  08/27/18 10:47 Active


 


 Vital Signs [RC] Q4HR Care  08/27/18 04:27 Active


 


 Consult to Case Management [CONS] Routine Cons  08/27/18 10:47 Active


 


 Consult to  [CONS] Routine Cons  08/27/18 10:47 Active


 


 Consult to Spiritual Care [CONS] Routine Cons  08/27/18 10:47 Active


 


 OT Evaluation and Treatment [CONS] Routine Cons  08/27/18 10:47 Active


 


 PT Evaluation and Treatment [CONS] Routine Cons  08/27/18 10:47 Active


 


 Respiratory Care Assess and Treatment [CONS] Routine Cons  08/27/18 10:47 

Active


 


 Heart Healthy Diet [DIET] Diet  08/27/18 Lunch Active


 


 BASIC METABOLIC PANEL,BMP [CHEM] AM Lab  08/28/18 05:11 Ordered


 


 BASIC METABOLIC PANEL,BMP [CHEM] AM Lab  08/29/18 05:11 Ordered


 


 BASIC METABOLIC PANEL,BMP [CHEM] AM Lab  08/30/18 05:11 Ordered


 


 BASIC METABOLIC PANEL,BMP [CHEM] AM Lab  08/31/18 05:11 Ordered


 


 CBC WITH AUTO DIFF [HEME] AM Lab  08/28/18 05:11 Ordered


 


 CBC WITH AUTO DIFF [HEME] AM Lab  08/29/18 05:11 Ordered


 


 CBC WITH AUTO DIFF [HEME] AM Lab  08/30/18 05:11 Ordered


 


 CBC WITH AUTO DIFF [HEME] AM Lab  08/31/18 05:11 Ordered


 


 CRP [C-REACTIVE PROTEIN] [CHEM] AM Lab  08/28/18 05:11 Ordered


 


 CRP [C-REACTIVE PROTEIN] [CHEM] AM Lab  08/29/18 05:11 Ordered


 


 CRP [C-REACTIVE PROTEIN] [CHEM] AM Lab  08/30/18 05:11 Ordered


 


 CRP [C-REACTIVE PROTEIN] [CHEM] AM Lab  08/31/18 05:11 Ordered


 


 CULTURE BLOOD [BC] Stat Lab  08/27/18 00:20 Received


 


 CULTURE BLOOD [BC] Stat Lab  08/27/18 00:33 Results


 


 INR,PT,PROTHROMBIN TIME [COAG] AM Lab  08/28/18 05:11 Ordered


 


 INR,PT,PROTHROMBIN TIME [COAG] AM Lab  08/29/18 05:11 Ordered


 


 INR,PT,PROTHROMBIN TIME [COAG] AM Lab  08/30/18 05:11 Ordered


 


 INR,PT,PROTHROMBIN TIME [COAG] AM Lab  08/31/18 05:11 Ordered


 


 MAGNESIUM [CHEM] AM Lab  08/28/18 05:11 Ordered


 


 MAGNESIUM [CHEM] AM Lab  08/29/18 05:11 Ordered


 


 MAGNESIUM [CHEM] AM Lab  08/30/18 05:11 Ordered


 


 MAGNESIUM [CHEM] AM Lab  08/31/18 05:11 Ordered


 


 UA W/MICROSCOPIC [URIN] Stat Lab  08/27/18 00:39 Ordered


 


 Acetaminophen [Tylenol] Med  08/27/18 10:47 Active





 650 mg PO Q4H PRN   


 


 Albuterol/Ipratropium [DuoNeb 3.0-0.5 MG/3 ML] Med  08/27/18 10:47 Active





 3 ml NEB Q4H PRN   


 


 Allopurinol [Zyloprim] Med  08/27/18 11:00 Active





 50 mg PO DAILY   


 


 Bisacodyl [Dulcolax] Med  08/27/18 10:47 Active





 5 mg PO DAILY PRN   


 


 Docusate Sodium [Colace] Med  08/27/18 10:47 Active





 100 mg PO BID PRN   


 


 Docusate Sodium/Sennosides [Senna Plus] Med  08/27/18 10:47 Active





 1 tab PO BID PRN   


 


 Furosemide [Lasix] Med  08/27/18 11:00 Active





 60 mg PO DAILY   


 


 Levothyroxine [Synthroid] Med  08/28/18 06:00 Active





 50 mcg PO ACBREAKFAST   


 


 Magnesium Rep Pharmacy to Dose [Pharmacy to Dose - Med  08/27/18 11:00 Active





 Magnesium Replacement]   





 1 dose .XX ASDIRECTED   


 


 Metoprolol Succinate [Toprol XL] Med  08/27/18 11:00 Active





 100 mg PO DAILY   


 


 Metoprolol Tartrate [Lopressor] Med  08/27/18 10:56 Active





 5 mg IVPUSH Q4H PRN   


 


 Ondansetron [Zofran ODT] Med  08/27/18 10:47 Active





 4 mg PO Q6H PRN   


 


 Ondansetron [Zofran] Med  08/27/18 10:47 Active





 4 mg IV Q6H PRN   


 


 Patient's Own Medication [Ptom] Med  08/27/18 16:00 Active





 0 each TOP DAILY@1600   


 


 Polyethylene Glycol 3350 [MiraLAX] Med  08/27/18 10:47 Active





 17 gm PO DAILY PRN   


 


 Potassium Rep Pharmacy to Dose [Pharmacy to Dose - Med  08/27/18 11:00 Active





 Potassium Replacement]   





 1 dose .XX ASDIRECTED   


 


 Sodium Chloride 0.9% [Saline Flush] Med  08/27/18 04:27 Active





 10 ml FLUSH ASDIRECTED PRN   


 


 Spironolactone [Aldactone] Med  08/27/18 11:00 Active





 25 mg PO DAILY   


 


 Warfarin Pharmacy to Dose [Pharmacy to Dose - Warfarin] Med  08/27/18 13:00 

Active





 0 dose .XX ASDIRECTED PRN   


 


 Warfarin Sliding Scale [Coumadin Sliding Scale] Med  08/27/18 18:00 Active





 0 each PO QPM   


 


 cefTRIAXone [Rocephin] 1 gm Med  08/28/18 01:00 Active





 Sodium Chloride 0.9% [Normal Saline] 100 ml   





 IV Q24H   


 


 hydrALAZINE [Apresoline] Med  08/27/18 10:56 Active





 10 mg IVPUSH Q6H PRN   


 


 Blood Culture x2 Reflex Set [OM.PC] Stat Oth  08/27/18 00:00 Ordered


 


 Saline Lock Insert [OM.PC] Routine Oth  08/27/18 04:27 Ordered


 


 Resuscitation Status Routine Resus Stat  08/27/18 04:26 Ordered


 


 EKG 12 Lead [EK] Stat Ther  08/27/18 01:38 Ordered








 Medication Orders





Acetaminophen (Tylenol)  650 mg PO Q4H PRN


   PRN Reason: Pain (Mild 1-3)/fever


Albuterol/Ipratropium (Duoneb 3.0-0.5 Mg/3 Ml)  3 ml NEB Q4H PRN


   PRN Reason: Shortness Of Breath/wheezing


Allopurinol (Zyloprim)  50 mg PO DAILY [facility]


   Last Admin: 08/27/18 11:26  Dose: 50 mg


Bisacodyl (Dulcolax)  5 mg PO DAILY PRN


   PRN Reason: Constipation


Docusate Sodium (Colace)  100 mg PO BID PRN


   PRN Reason: Constipation


Furosemide (Lasix)  60 mg PO DAILY [facility]


   Last Admin: 08/27/18 11:26  Dose: 60 mg


Hydralazine HCl (Apresoline)  10 mg IVPUSH Q6H PRN


   PRN Reason: Hypertension


Ceftriaxone Sodium 1 gm/ (Sodium Chloride)  100 mls @ 200 mls/hr IV Q24H [facility]


Levothyroxine Sodium (Synthroid)  50 mcg PO ACBREAKFAST [facility]


Magnesium Sulfate (Pharmacy To Dose - Magnesium Replacement)  1 dose .XX 

ASDIRECTED [facility]


Metoprolol Succinate (Toprol Xl)  100 mg PO DAILY [facility]


   Last Admin: 08/27/18 11:27  Dose:  


Metoprolol Tartrate (Lopressor)  5 mg IVPUSH Q4H PRN


   PRN Reason: Tachycardia


Ondansetron HCl (Zofran Odt)  4 mg PO Q6H PRN


   PRN Reason: nausea, able to take PO


Ondansetron HCl (Zofran)  4 mg IV Q6H PRN


   PRN Reason: Nausea/Vomiting


Rivastigmine 9.5mg (Patch**Own Med**)  0 each TOP DAILY@1600 [facility]


   Last Admin: 08/27/18 16:12  Dose: 1 each


Polyethylene Glycol (Miralax)  17 gm PO DAILY PRN


   PRN Reason: Constipation


Potassium Chloride (Pharmacy To Dose - Potassium Replacement)  1 dose .XX 

ASDIRECTED [facility]


Senna/Docusate Sodium (Senna Plus)  1 tab PO BID PRN


   PRN Reason: Constipation


Sodium Chloride (Saline Flush)  10 ml FLUSH ASDIRECTED PRN


   PRN Reason: Keep Vein Open


Spironolactone (Aldactone)  25 mg PO DAILY [facility]


   Last Admin: 08/27/18 11:26  Dose: 25 mg


Warfarin Sodium (Pharmacy To Dose - Warfarin)  0 dose .XX ASDIRECTED PRN


   PRN Reason: RX TO DOSE WARFARIN


Warfarin Sodium (Coumadin Sliding Scale)  0 each PO QPM [facility]


   Stop: 08/27/18 21:00








Assessment/Plan Comment:: 





I/P:


Acute:





Fever of Unknown Origin, Improved


* Risk factor: tooth pulled 8/22; had come into ED 8/24 for increased confusion


* 101.8 rectally yesterday --> 99.7 now


* WBC slightly elevated at 9.3, ESR 33, CRP 2.2--> 3.5


* CXR --> shows no acute abnormality


* U/A negative for UTI


* Sepsis Protocol


 * Lactic Acid normal at 1.6


 * Blood cultures--> 1 growing Gram - rods after 12H; most likely d/t 

contamination


 * Repeat Blood cultures


 * Rocephin started in ED--> D/C; there seems to be no clear source of infection


* Tylenol given in ED--> Continue PRN 





Diarrhea


* intermittent x2 weeks per wife


* No blood in stool


* Florastor





Confusion


* Acute on Chronic (h/o Lewy Body Dementia); increased per wife 


* Risk factor: recent change in medications


 * Was recently started on Seroquel; per records, seems to have started to 

cause Parkinsonian movements 


 * Was in ED 8/24 for increased confusion and was taken off both Seroquel and 

Namenda by ED provider


* Wife would like to restart Namenda here as she states he seems worse without 

it; will bring in the bottle tomorrow AM


* SLP swallow eval





Resolved:


Dyspnea


* Was dyspneic on scene according to EMS, 93% RA


* No cough, CXR clear


* Now 97% RA





Chronic:


CHF


* 


* Continue Lasix and Spironolactone


Pacemaker


Lewy Body Dementia


Gout


Impaired vision


Hypothyroid


Anticoagulation therapy


Bile Duct CA s/p Whipple 





Plan:


Transferred to medical floor


He remains stable and continues to improve clinically


Other orders as indicated above


Routine AM labs


Heart Healthy Diet


SW/CM for discharge planning --> may need SNF placement; also code status needs 

to be addressed


PT/OT 


DVT Prophylaxis: Warfarin


GI Prophylaxis: Pepcid


Ambulate with assistance


Code Status: Full Code; PCP: Dr. Moran


Wife is POA

## 2018-08-27 NOTE — EDM.PDOC
ED HPI GENERAL MEDICAL PROBLEM





- General


Chief Complaint: Respiratory Problem


Stated Complaint: [facility] ambulance


Time Seen by Provider: 08/27/18 02:20


Source of Information: Reports: Patient, Family





- History of Present Illness


Onset: Today


Treatments PTA: Reports: IV/IO





- Related Data


 Allergies











Allergy/AdvReac Type Severity Reaction Status Date / Time


 


procaine Allergy Unknown unknown Verified 08/27/18 03:28











Home Meds: 


 Home Meds





Allopurinol [Zyloprim] 50 mg PO DAILY 08/24/18 [History]


Furosemide 60 mg PO DAILY 08/24/18 [History]


Levothyroxine [Synthroid] 50 mcg PO ACBREAKFAST 08/24/18 [History]


Metoprolol Succinate [Toprol XL 100mg] 100 mg PO DAILY 08/24/18 [History]


Rivastigmine 1 each TD DAILY 08/24/18 [History]


Spironolactone [Aldactone] 25 mg PO DAILY #21 tab 08/24/18 [Rx]


Warfarin Sodium [Jantoven] 10 mg PO DAILY 08/27/18 [History]











Past Medical History


HEENT History: Reports: Impaired Vision


Cardiovascular History: Reports: Heart Failure, Pacemaker (With battery change 

about 2 years ago.)


Musculoskeletal History: Reports: Gout


Neurological History: Reports: Other (See Below) (Has Lewy body dementia.)


Other Neuro History: lewie body dementia


Psychiatric History: Reports: Dementia


Endocrine/Metabolic History: Reports: Hypothyroidism


Hematologic History: Reports: Anticoagulation Therapy





- Past Surgical History


HEENT Surgical History: Reports: Other (See Below)


Other HEENT Surgeries/Procedures: teeth pulled





Social & Family History





- Caffeine Use


Caffeine Use: Reports: None





- Living Situation & Occupation


Living situation: Reports: , with Family (Wife cares for him.)


Occupation: Retired





ED ROS GENERAL





- Review of Systems


Review Of Systems: See Below (see dictation)





ED EXAM, GENERAL





- Physical Exam


Exam: See Below (dictated)





Course





- Vital Signs


Last Recorded V/S: 


 Last Vital Signs











Temp  38.8 C H  08/27/18 01:20


 


Pulse  60   08/27/18 03:10


 


Resp  24 H  08/27/18 03:10


 


BP  108/55 L  08/27/18 03:10


 


Pulse Ox  98   08/27/18 03:10














- Orders/Labs/Meds


Orders: 


 Active Orders 24 hr











 Category Date Time Status


 


 Chest 1V Frontal [CR] Stat Exams  08/27/18 00:01 Taken


 


 CULTURE BLOOD [BC] Stat Lab  08/27/18 00:20 Received


 


 CULTURE BLOOD [BC] Stat Lab  08/27/18 00:33 Received


 


 UA W/MICROSCOPIC [URIN] Stat Lab  08/27/18 00:39 Ordered


 


 Blood Culture x2 Reflex Set [OM.PC] Stat Oth  08/27/18 00:00 Ordered


 


 EKG 12 Lead [EK] Stat Ther  08/27/18 01:38 Ordered








 Medication Orders





Sodium Chloride (Saline Flush)  10 ml FLUSH ASDIRECTED PRN


   PRN Reason: Keep Vein Open








Labs: 


 Laboratory Tests











  08/27/18 08/27/18 08/27/18 Range/Units





  00:20 00:20 00:20 


 


WBC  9.30 H    (4.23-9.07)  K/mm3


 


RBC  4.70    (4.63-6.08)  M/mm3


 


Hgb  14.8    (13.7-17.5)  gm/L


 


Hct  43.3    (40.1-51.0)  %


 


MCV  92.1    (79.0-92.2)  fl


 


MCH  31.5    (25.7-32.2)  pg


 


MCHC  34.2    (32.2-35.5)  g/dl


 


RDW Std Deviation  45.5 H    (35.1-43.9)  fL


 


Plt Count  182    (163-337)  K/mm3


 


MPV  9.2 L    (9.4-12.3)  fl


 


Neutrophils % (Manual)  87 H    (40-60)  %


 


Band Neutrophils %  0    (0-10)  %


 


Lymphocytes % (Manual)  7 L    (20-40)  %


 


Atypical Lymphs %  0    %


 


Monocytes % (Manual)  6    (2-10)  %


 


Eosinophils % (Manual)  0 L    (0.8-7.0)  %


 


Basophils % (Manual)  0 L    (0.2-1.2)  


 


Toxic Granulation  1+ slight    


 


Platelet Estimate  Adequate    


 


Plt Morphology Comment  Normal    


 


Anisocytosis  1+ slight    


 


Microcytosis  1+ slight    


 


Macrocytosis  1+ slight    


 


RBC Morph Comment  Abnormal    


 


ESR   33 H   (0-15)  mm/hr


 


Sodium    141  (136-145)  mEq/L


 


Potassium    4.0  (3.5-5.1)  mEq/L


 


Chloride    104  ()  mEq/L


 


Carbon Dioxide    28  (21-32)  mEq/L


 


Anion Gap    13.0  (5-15)  


 


BUN    22 H  (7-18)  mg/dL


 


Creatinine    1.5 H  (0.7-1.3)  mg/dL


 


Est Cr Clr Drug Dosing    TNP  


 


Estimated GFR (MDRD)    45  (>60)  mL/min


 


BUN/Creatinine Ratio    14.7  (14-18)  


 


Glucose    141 H  ()  mg/dL


 


Lactic Acid     (0.4-2.0)  mmol/L


 


Calcium    8.9  (8.5-10.1)  mg/dL


 


Total Bilirubin    1.3 H  (0.2-1.0)  mg/dL


 


AST    68 H  (15-37)  U/L


 


ALT    60  (16-63)  U/L


 


Alkaline Phosphatase    127 H  ()  U/L


 


C-Reactive Protein    2.2 H*  (<1.0)  mg/dL


 


NT-Pro-B Natriuret Pep     (0-450)  pg/mL


 


Total Protein    7.2  (6.4-8.2)  g/dl


 


Albumin    3.5  (3.4-5.0)  g/dl


 


Globulin    3.7  gm/dL


 


Albumin/Globulin Ratio    1.0  (1-2)  


 


Urine Color     (Yellow)  


 


Urine Appearance     (Clear)  


 


Urine pH     (5.0-8.0)  


 


Ur Specific Gravity     (1.005-1.030)  


 


Urine Protein     (Negative)  


 


Urine Glucose (UA)     (Negative)  


 


Urine Ketones     (Negative)  


 


Urine Occult Blood     (Negative)  


 


Urine Nitrite     (Negative)  


 


Urine Bilirubin     (Negative)  


 


Urine Urobilinogen     (0.2-1.0)  


 


Ur Leukocyte Esterase     (Negative)  


 


Urine RBC     (0-5)  /hpf


 


Urine WBC     (0-5)  /hpf


 


Ur Epithelial Cells     (0-5)  /hpf


 


Urine Bacteria     (FEW)  /hpf


 


Hyaline Casts     (0-5)  /lpf


 


Urine Mucus     (FEW)  /hpf














  08/27/18 08/27/18 08/27/18 Range/Units





  00:20 00:20 00:39 


 


WBC     (4.23-9.07)  K/mm3


 


RBC     (4.63-6.08)  M/mm3


 


Hgb     (13.7-17.5)  gm/L


 


Hct     (40.1-51.0)  %


 


MCV     (79.0-92.2)  fl


 


MCH     (25.7-32.2)  pg


 


MCHC     (32.2-35.5)  g/dl


 


RDW Std Deviation     (35.1-43.9)  fL


 


Plt Count     (163-337)  K/mm3


 


MPV     (9.4-12.3)  fl


 


Neutrophils % (Manual)     (40-60)  %


 


Band Neutrophils %     (0-10)  %


 


Lymphocytes % (Manual)     (20-40)  %


 


Atypical Lymphs %     %


 


Monocytes % (Manual)     (2-10)  %


 


Eosinophils % (Manual)     (0.8-7.0)  %


 


Basophils % (Manual)     (0.2-1.2)  


 


Toxic Granulation     


 


Platelet Estimate     


 


Plt Morphology Comment     


 


Anisocytosis     


 


Microcytosis     


 


Macrocytosis     


 


RBC Morph Comment     


 


ESR     (0-15)  mm/hr


 


Sodium     (136-145)  mEq/L


 


Potassium     (3.5-5.1)  mEq/L


 


Chloride     ()  mEq/L


 


Carbon Dioxide     (21-32)  mEq/L


 


Anion Gap     (5-15)  


 


BUN     (7-18)  mg/dL


 


Creatinine     (0.7-1.3)  mg/dL


 


Est Cr Clr Drug Dosing     


 


Estimated GFR (MDRD)     (>60)  mL/min


 


BUN/Creatinine Ratio     (14-18)  


 


Glucose     ()  mg/dL


 


Lactic Acid   1.6   (0.4-2.0)  mmol/L


 


Calcium     (8.5-10.1)  mg/dL


 


Total Bilirubin     (0.2-1.0)  mg/dL


 


AST     (15-37)  U/L


 


ALT     (16-63)  U/L


 


Alkaline Phosphatase     ()  U/L


 


C-Reactive Protein     (<1.0)  mg/dL


 


NT-Pro-B Natriuret Pep  754 H    (0-450)  pg/mL


 


Total Protein     (6.4-8.2)  g/dl


 


Albumin     (3.4-5.0)  g/dl


 


Globulin     gm/dL


 


Albumin/Globulin Ratio     (1-2)  


 


Urine Color    Yellow  (Yellow)  


 


Urine Appearance    Clear  (Clear)  


 


Urine pH    6.0  (5.0-8.0)  


 


Ur Specific Gravity    > or = 1.030  (1.005-1.030)  


 


Urine Protein    1+ H  (Negative)  


 


Urine Glucose (UA)    Negative  (Negative)  


 


Urine Ketones    Negative  (Negative)  


 


Urine Occult Blood    Negative  (Negative)  


 


Urine Nitrite    Negative  (Negative)  


 


Urine Bilirubin    Negative  (Negative)  


 


Urine Urobilinogen    1.0  (0.2-1.0)  


 


Ur Leukocyte Esterase    Negative  (Negative)  


 


Urine RBC    0-5  (0-5)  /hpf


 


Urine WBC    0-5  (0-5)  /hpf


 


Ur Epithelial Cells    0-5  (0-5)  /hpf


 


Urine Bacteria    Rare  (FEW)  /hpf


 


Hyaline Casts    0-5  (0-5)  /lpf


 


Urine Mucus    Many H  (FEW)  /hpf











Meds: 


Medications











Generic Name Dose Route Start Last Admin





  Trade Name Fretarsha  PRN Reason Stop Dose Admin


 


Sodium Chloride  10 ml  08/27/18 04:27  





  Saline Flush  FLUSH   





  ASDIRECTED PRN   





  Keep Vein Open   





     





     





     














Discontinued Medications














Generic Name Dose Route Start Last Admin





  Trade Name Freq  PRN Reason Stop Dose Admin


 


Acetaminophen  650 mg  08/27/18 01:02  08/27/18 01:09





  Tylenol  PO  08/27/18 01:03  650 mg





  NOW ONE   Administration





     





     





     





     


 


Ceftriaxone Sodium 1 gm/  100 mls @ 200 mls/hr  08/27/18 01:38  08/27/18 02:02





  Sodium Chloride  IV  08/27/18 02:07  200 mls/hr





  ONETIME ONE   Administration





     





     





     





     














Departure





- Departure


Time of Disposition: 02:21


Disposition: Admitted As Inpatient 64


Condition: Poor


Clinical Impression: 


Pneumonia


Qualifiers:


 Pneumonia type: due to unspecified organism Laterality: right Lung location: 

upper lobe of lung Qualified Code(s): J18.1 - Lobar pneumonia, unspecified 

organism








- Discharge Information


*PRESCRIPTION DRUG MONITORING PROGRAM REVIEWED*: No


*COPY OF PRESCRIPTION DRUG MONITORING REPORT IN PATIENT MARISA: No





- My Orders


Last 24 Hours: 


My Active Orders





08/27/18 00:00


Blood Culture x2 Reflex Set [OM.PC] Stat 





08/27/18 00:01


Chest 1V Frontal [CR] Stat 





08/27/18 00:20


CULTURE BLOOD [BC] Stat 





08/27/18 00:33


CULTURE BLOOD [BC] Stat 





08/27/18 00:39


UA W/MICROSCOPIC [URIN] Stat 





08/27/18 01:38


EKG 12 Lead [EK] Stat 














- Assessment/Plan


Last 24 Hours: 


My Active Orders





08/27/18 00:00


Blood Culture x2 Reflex Set [OM.PC] Stat 





08/27/18 00:01


Chest 1V Frontal [CR] Stat 





08/27/18 00:20


CULTURE BLOOD [BC] Stat 





08/27/18 00:33


CULTURE BLOOD [BC] Stat 





08/27/18 00:39


UA W/MICROSCOPIC [URIN] Stat 





08/27/18 01:38


EKG 12 Lead [EK] Stat

## 2018-08-28 RX ADMIN — Medication SCH EACH: at 17:08

## 2018-08-28 RX ADMIN — Medication SCH MG: at 21:16

## 2018-08-28 RX ADMIN — Medication SCH MG: at 08:06

## 2018-08-28 RX ADMIN — METRONIDAZOLE SCH MLS/HR: 500 SOLUTION INTRAVENOUS at 21:16

## 2018-08-28 NOTE — ER
REASON FOR ADMISSION:

Dementia and pneumonia.

 

HISTORY:

This is a 77-year-old man who was brought to the emergency department by his

wife and his son.  He has a history of Lewy body dementia.  He recently had a

tooth extracted on Wednesday which was done under local anesthesia.

Postoperatively, his baseline contusion worsened according to the wife and son

and he had increased difficulty with ambulation much more so than usual,

particularly throughout the day today.  They noticed an increased labored

breathing this evening and felt that he was warm and sweaty, but did not take a

temperature.  It is noteworthy that he was recently given a prescription for

Aldactone for worsening congestive heart failure, but they did not fill the

prescription as of yet.

 

PAST MEDICAL HISTORY:

1. Reviewed.  See EMR.  It is noteworthy that he as a pacemaker and history of

    congestive heart failure.

2. He underwent a Whipple procedure for common bile duct carcinoma ten years

    ago.

3. Hypertension.

4. He is a nondiabetic with a remote history of smoking.

 

CURRENT MEDICATIONS:

Reviewed.  See EMR.

 

ALLERGIES:

None to medications.

 

REVIEW OF SYSTEMS:

Pertinent positives and negatives as in the HPI.

 

PHYSICAL EXAMINATION:

VITAL SIGNS:  See EMR.  He is febrile at 38.8, blood pressure 139/86, heart rate

is 65, O2 sat is 95%.

HEENT:  He has some scleral injection, but no scleral icterus is noted.  Oral

mucosa seems somewhat dry.

NECK:  Reasonably supple.  No bruits.  No JVD is noted.

CHEST:  He does have a few scattered rhonchi.  No wheezes are noted.  Reasonably

good air exchange.

CARDIAC:  Regular rate without murmur.  Also on chest exam, pacemaker is noted

in the left upper outer chest wall area.

ABDOMEN:  Soft and nontender.  Normal bowel sounds, nondistended.

EXTREMITIES:  He has good popliteal pulses and I think he has ankle pulses as

well.  His feet are warm and pink.  He has venous stasis changes.

NEUROLOGIC:  He moves all four extremities to commands.  Face is symmetrical.

His affect initially appeared flat, but when I tried to engage him, he actually

answered questions reasonably appropriately and even showed some degree of a

sense of humor.  We talked about fishing, etc. and he was reasonably lucid.

 

FURTHER EMERGENCY ROOM COURSE:

A chest x-ray was obtained and it was a portable upright.  It does appear

possibly to be some right upper lobe infiltrative changes, but I am real

confident of that.  The quality of the film is borderline in terms of making a

diagnosis of pneumonia in my opinion.  His CMP reveals normal electrolytes.  His

BUN is 22 with a creatinine of 1.5.  His glucose is 141, but this is nonfasting.

His total bilirubin is 1.3 with an alkaline phosphatase of 127.  His BNP was

1842 on 08/24 and it is down to 754 today.  Urine was negative for any sign of

UTI.

 

IMPRESSION:

It is obvious that the wife can no longer provide his cares adequately and she

has reached the limits of her ability to assist as this point in time.  He does

have a fever and with his increased labored breathing and physical findings,

clinically it appears that he may well have pneumonia.  We will start him on

Rocephin for starters.  I discussed the patient's admission with Dr. Campos who

agrees that he should be admitted.  The patient's family understands and agrees

with this plan.

 

DD:  08/27/2018 02:19:19

DT:  08/27/2018 10:25:43  [facility]

Job #:  [id number]

## 2018-08-28 NOTE — PCM.PN
- General Info


Date of Service: 08/28/18


Admission Dx/Problem (Free Text): 


 Admission Diagnosis/Problem





Admission Diagnosis/Problem      Pneumonia








Subjective Update: 


In to see Taz. Overall he is doing well and has no complaints. He is sitting 

in a chair visiting with family. He is still very confused d/t his h/o Lewy 

Body Dementia, so I talked with his wife (POA) and daughter. I explained to the 

family that his repeat CXR was clear but that one of his blood cultures did 

come back with Gram - rods. This may be contamination, but we will repeat blood 

cultures to check. I also explained that I had stopped the antibiotics 

yesterday as there was no clear cause of infection, his temperature had gone 

down, and his labs had improved overall. The family understood and agreed and 

also requested that I talk to the pt's PCP, Dr. Moran. 





I updated Dr. Moran on the pt's condition and how there was no clear source 

of infection. He recommended that we try a CT of the abdomen since there is a 

history of bile duct cancer and that the positive blood culture sounded 

suspicious. He also requested that I get in touch with the pt's Neurologist in 

regards to the new onset of Parkinsonian movements and hallucinations that are 

most likely due to the Seroquel (the medications were stopped by the ER doctor 

a few days ago, but the wife wanted to restart the Namenda today). I got in 

contact with the neurologist, Dr. Yoon in [place], and he agreed to 

restarting the Namenda and stopping the Seroquel. He also recommended that the 

pt be placed on Nuplazid for the hallucinations. He will write a prescription 

so they may follow up with him outpt.





I then had a meeting with the wife and  Vicenta regarding the new 

medication recommended by the neurologist and the recommendation by Dr. Moran to get an abdominal CT. I explained that we may not find anything on 

the CT as there are no clear symptoms, but we could do it d/t his history and 

if it would make her feel better to know. The wife agreed to the abdominal CT. 

She then stated "I don't want to start him on any new medications from the 

neurologist". I explained why he had prescribed the Nuplazid and that if she 

was not comfortable, she did not have to  the prescription. I also let 

her know that if she had any more questions she can f/u with his PCP Dr. Moran and have an outpt visit with the neurologist as well if she would 

like. We also discussed his code status, including DNR/DNI vs Comfort Care, 

which she has decided she would like him to be DNR/DNI. She is also now aware 

of Comfort Measures and understands it, but is not ready to change his status 

to it. Vicenta and I reassured her that this is not a decision that needs to be 

made right now, but that we just want her to be aware of her options. She 

stated she understood. 





CT results came back and it was found that Lonnie has Proctitis and possible 

cystitis. He will be started on Levaquin and Flagyl regimen. The wife and 

daughter were updated and were relieved to finally have a diagnosis that could 

possibly explain his fever. He will most likely be D/C'd in 2-3 days, pending 

his clinical disposition and need for IV medications.   








****************WIFE (POA) WOULD LIKE THE PT'S CODE STATUS TO BE SWITCHED TO DNR

/DNI. **************


Functional Status: Reports: Pain Controlled, Tolerating Diet, Ambulating (with 

assistance), Urinating





- Review of Systems


General: Reports: No Symptoms.  Denies: Fever


HEENT: Reports: No Symptoms


Pulmonary: Reports: No Symptoms.  Denies: Shortness of Breath, Cough


Cardiovascular: Reports: No Symptoms.  Denies: Chest Pain


Gastrointestinal: Reports: No Symptoms.  Denies: Abdominal Pain, Constipation, 

Diarrhea, Nausea, Vomiting


Genitourinary: Reports: No Symptoms.  Denies: Dysuria, Frequency, Burning, Pain

, Urgency


Musculoskeletal: Reports: No Symptoms


Skin: Reports: No Symptoms


Neurological: Reports: Confusion (h/o Lewy Body Dementia)


Psychiatric: Reports: Confusion (h/o Lewy Body Dementia)





- Patient Data


Vitals - Most Recent: 


 Last Vital Signs











Temp  98.2 F   08/28/18 15:33


 


Pulse  60   08/28/18 15:33


 


Resp  28 H  08/28/18 15:33


 


BP  114/65   08/28/18 15:33


 


Pulse Ox  100   08/28/18 15:33











Weight - Most Recent: 184 lb 5 oz


I&O - Last 24 Hours: 


 Intake & Output











 08/28/18 08/28/18 08/28/18





 06:59 14:59 22:59


 


Intake Total 


 


Output Total 625  1150


 


Balance -425 540 0











Lab Results Last 24 Hours: 


 Laboratory Results - last 24 hr











  08/28/18 08/28/18 08/28/18 Range/Units





  04:54 04:54 04:54 


 


WBC  7.15    (4.23-9.07)  K/mm3


 


RBC  4.49 L    (4.63-6.08)  M/mm3


 


Hgb  14.2    (13.7-17.5)  gm/L


 


Hct  41.4    (40.1-51.0)  %


 


MCV  92.2    (79.0-92.2)  fl


 


MCH  31.6    (25.7-32.2)  pg


 


MCHC  34.3    (32.2-35.5)  g/dl


 


RDW Std Deviation  45.1 H    (35.1-43.9)  fL


 


Plt Count  148 L    (163-337)  K/mm3


 


MPV  9.0 L    (9.4-12.3)  fl


 


Neut % (Auto)  66.7    (34.0-67.9)  %


 


Lymph % (Auto)  18.6 L    (21.8-53.1)  %


 


Mono % (Auto)  11.3    (5.3-12.2)  %


 


Eos % (Auto)  3.2    (0.8-7.0)  


 


Baso % (Auto)  0.1    (0.1-1.2)  %


 


Neut # (Auto)  4.76    (1.78-5.38)  K/mm3


 


Lymph # (Auto)  1.33    (1.32-3.57)  K/mm3


 


Mono # (Auto)  0.81    (0.30-0.82)  K/mm3


 


Eos # (Auto)  0.23    (0.04-0.54)  K/mm3


 


Baso # (Auto)  0.01    (0.01-0.08)  K/mm3


 


PT    22.9 H  (9.5-12.1)  SECONDS


 


INR    2.13  


 


Sodium   139   (136-145)  mEq/L


 


Potassium   3.3 L   (3.5-5.1)  mEq/L


 


Chloride   101   ()  mEq/L


 


Carbon Dioxide   30   (21-32)  mEq/L


 


Anion Gap   11.3   (5-15)  


 


BUN   19 H   (7-18)  mg/dL


 


Creatinine   1.3   (0.7-1.3)  mg/dL


 


Est Cr Clr Drug Dosing   43.08   mL/min


 


Estimated GFR (MDRD)   53   (>60)  mL/min


 


BUN/Creatinine Ratio   14.6   (14-18)  


 


Glucose   102   ()  mg/dL


 


Calcium   8.5   (8.5-10.1)  mg/dL


 


Magnesium   1.7 L   (1.8-2.4)  mg/dl


 


C-Reactive Protein   7.3 H*   (<1.0)  mg/dL











Jose Results Last 24 Hours: 


 Microbiology











 08/27/18 00:33 Aerobic Blood Culture - Preliminary





 Blood - Venous - Lab Draw    NO GROWTH AFTER 1 DAY





 Anaerobic Blood Culture - Preliminary





    Gram Negative Rods


 


 08/27/18 00:20 Aerobic Blood Culture - Preliminary





 Blood - Venous    NO GROWTH AFTER 1 DAY





 Anaerobic Blood Culture - Preliminary





    NO GROWTH AFTER 1 DAY











Med Orders - Current: 


 Current Medications





Acetaminophen (Tylenol)  650 mg PO Q4H PRN


   PRN Reason: Pain (Mild 1-3)/fever


Albuterol/Ipratropium (Duoneb 3.0-0.5 Mg/3 Ml)  3 ml NEB Q4H PRN


   PRN Reason: Shortness Of Breath/wheezing


Allopurinol (Zyloprim)  50 mg PO DAILY [facility]


   Last Admin: 08/28/18 08:05 Dose:  50 mg


Bisacodyl (Dulcolax)  5 mg PO DAILY PRN


   PRN Reason: Constipation


Docusate Sodium (Colace)  100 mg PO BID PRN


   PRN Reason: Constipation


Famotidine (Pepcid)  20 mg PO BID [facility]


   Last Admin: 08/28/18 08:07 Dose:  20 mg


Furosemide (Lasix)  60 mg PO DAILY [facility]


   Last Admin: 08/28/18 08:06 Dose:  60 mg


Hydralazine HCl (Apresoline)  10 mg IVPUSH Q6H PRN


   PRN Reason: Hypertension


Levofloxacin/Dextrose 750 mg/ (Premix)  150 mls @ 100 mls/hr IV Q48H [facility]


   Last Admin: 08/28/18 18:39 Dose:  100 mls/hr


Metronidazole 500 mg/ Premix  100 mls @ 100 mls/hr IV Q12H [facility]


Levothyroxine Sodium (Synthroid)  50 mcg PO ACBREAKFAST [facility]


   Last Admin: 08/28/18 05:12 Dose:  50 mcg


Magnesium Sulfate (Pharmacy To Dose - Magnesium Replacement)  1 dose .XX 

ASDIRECTED [facility]


Memantine (Namenda)  5 mg PO BID [facility]


Metoprolol Succinate (Toprol Xl)  100 mg PO DAILY [facility]


   Last Admin: 08/28/18 09:48 Dose:  Not Given


Metoprolol Tartrate (Lopressor)  5 mg IVPUSH Q4H PRN


   PRN Reason: Tachycardia


Ondansetron HCl (Zofran Odt)  4 mg PO Q6H PRN


   PRN Reason: nausea, able to take PO


Ondansetron HCl (Zofran)  4 mg IV Q6H PRN


   PRN Reason: Nausea/Vomiting


Rivastigmine 9.5mg (Patch**Own Med**)  0 each TOP DAILY@1600 [facility]


   Last Admin: 08/28/18 17:08 Dose:  1 each


Polyethylene Glycol (Miralax)  17 gm PO DAILY PRN


   PRN Reason: Constipation


   Last Admin: 08/28/18 11:12 Dose:  17 gm


Potassium Chloride (Pharmacy To Dose - Potassium Replacement)  1 dose .XX 

ASDIRECTED [facility]


Saccharomyces Boulardii (Florastor)  250 mg PO BID [facility]


   Last Admin: 08/28/18 08:06 Dose:  250 mg


Senna/Docusate Sodium (Senna Plus)  1 tab PO BID PRN


   PRN Reason: Constipation


Sodium Chloride (Saline Flush)  10 ml FLUSH ASDIRECTED PRN


   PRN Reason: Keep Vein Open


Spironolactone (Aldactone)  25 mg PO DAILY [facility]


   Last Admin: 08/28/18 08:06 Dose:  25 mg


Warfarin Sodium (Pharmacy To Dose - Warfarin)  0 dose .XX ASDIRECTED PRN


   PRN Reason: RX TO DOSE WARFARIN


Warfarin Sodium (Coumadin)  10 mg PO DAILY@1800 [facility]


   Stop: 08/28/18 21:00


   Last Admin: 08/28/18 17:08 Dose:  10 mg





Discontinued Medications





Acetaminophen (Tylenol)  650 mg PO NOW ONE


   Stop: 08/27/18 01:03


   Last Admin: 08/27/18 01:09 Dose:  650 mg


Ceftriaxone Sodium 1 gm/ (Sodium Chloride)  100 mls @ 200 mls/hr IV ONETIME ONE


   Stop: 08/27/18 02:07


   Last Admin: 08/27/18 02:02 Dose:  200 mls/hr


Ceftriaxone Sodium 1 gm/ (Sodium Chloride)  100 mls @ 200 mls/hr IV Q24H [facility]


Magnesium Sulfate 2 gm/ Premix  50 mls @ 25 mls/hr IV ONETIME ONE


   Stop: 08/28/18 10:59


   Last Admin: 08/28/18 08:08 Dose:  25 mls/hr


Sodium Chloride (Normal Saline)  100 mls @ 60 mls/hr IV ASDIRECTED [facility]


   Stop: 08/28/18 18:24


   Last Admin: 08/28/18 16:53 Dose:  60 mls/hr


Iopamidol (Isovue-370 (76%))  100 ml IVPUSH ONETIME ONE


   Stop: 08/28/18 16:44


   Last Admin: 08/28/18 16:53 Dose:  80 ml


Rivastigmine [ (Rivastigmine] 1 Each)  0 each TOP DAILY [facility]


   Last Admin: 08/27/18 19:01 Dose:  Not Given


Potassium Chloride (Klor-Con M20)  40 meq PO ONETIME ONE


   Stop: 08/28/18 09:01


   Last Admin: 08/28/18 08:06 Dose:  40 meq


Sodium Chloride (Saline Flush)  10 ml FLUSH ONETIME ONE


   Stop: 08/28/18 16:44


   Last Admin: 08/28/18 16:53 Dose:  10 ml


Warfarin Sodium (Coumadin)  10 mg PO SuMoWeFrSa [facility]


Warfarin Sodium (Coumadin)  5 mg PO TuTh@1800 [facility]


Warfarin Sodium (Coumadin Sliding Scale)  0 each PO QPM [facility]


   Stop: 08/27/18 21:00


   Last Admin: 08/27/18 19:44 Dose:  Not Given











- Exam


Quality Assessment: DVT Prophylaxis.  No: Supplemental Oxygen (100% on RA)


General: Alert, Cooperative, No Acute Distress.  No: Oriented (confused- h/o 

Lewy Body Dementia)


HEENT: Pupils Equal, Pupils Reactive, EOMI, Mucous Membr. Moist/Pink


Neck: Supple


Lungs: Clear to Auscultation, Normal Respiratory Effort


Cardiovascular: Regular Rate, Regular Rhythm


GI/Abdominal Exam: Normal Bowel Sounds, Soft, Non-Tender, No Organomegaly, No 

Distention, No Abnormal Bruit, No Mass, Pelvis Stable


 (Male) Exam: Deferred


Back Exam: Normal Inspection, Full Range of Motion


Extremities: Normal Inspection, Normal Range of Motion, Non-Tender, No Pedal 

Edema, Normal Capillary Refill


Peripheral Pulses: 2+: Posterior Tibial (L), Posterior Tibial (R), Dorsalis 

Pedis (L), Dorsalis Pedis (R)


Skin: Warm, Dry, Intact


Neurological: No New Focal Deficit


Psy/Mental Status: Alert, Normal Affect, Normal Mood, Other (Confused- h/o Lewy 

Body Dementia)





- Problem List & Annotations


(1) Fever, unknown origin


SNOMED Code(s): 1952961


   Code(s): R50.9 - FEVER, UNSPECIFIED   Status: Acute   Priority: High   

Current Visit: Yes   





(2) Hypoxia


SNOMED Code(s): 726241276


   Code(s): R09.02 - HYPOXEMIA   Status: Resolved   Priority: High   Current 

Visit: No   





(3) Severe dementia


SNOMED Code(s): 97222914


   Code(s): F03.90 - UNSPECIFIED DEMENTIA WITHOUT BEHAVIORAL DISTURBANCE   

Status: Chronic   Priority: Low   Current Visit: Yes   





(4) Proctitis


SNOMED Code(s): 0584203


   Code(s): K62.89 - OTHER SPECIFIED DISEASES OF ANUS AND RECTUM   Status: 

Acute   Priority: High   Current Visit: Yes   





(5) Bladder wall thickening


SNOMED Code(s): 739558306


   Code(s): N32.89 - OTHER SPECIFIED DISORDERS OF BLADDER   Status: Acute   

Priority: High   Current Visit: Yes   





- Problem List Review


Problem List Initiated/Reviewed/Updated: Yes





- My Orders


Last 24 Hours: 


My Active Orders





08/27/18 21:00


Famotidine [Pepcid]   20 mg PO BID 





08/27/18 22:17


CULTURE BLOOD [BC] Stat 


CULTURE BLOOD [BC] Stat 





08/28/18 05:11


Blood Culture x2 Reflex Set [OM.PC] AM 





08/28/18 09:00


Saccharomyces Boulardii [Florastor]   250 mg PO BID 





08/28/18 16:12


Abdomen Pelvis w Cont [CT] Routine 





08/28/18 18:00


Levofloxacin/Dextrose 5%-Water [Levaquin in D5W 750 MG/150 ML] 750 mg   Premix 

Bag 1 bag IV Q48H 





08/28/18 20:00


metroNIDAZOLE/Normal Saline [Flagyl 500 MG in  ML] 500 mg   Premix Bag 1 

bag IV Q12H 





08/28/18 21:00


Memantine [Namenda]   5 mg PO BID 














- Plan


Plan:: 





I/P:


Acute:





Proctitis and Possible Cystitis


* Fever resolved; 101.8 rectally in ED --> 98.2 now


* WBC 9.3--> 7.15, ESR 33, CRP 2.2--> 7.3


* U/A negative for UTI


* Sepsis Protocol:


 * Lactic Acid normal at 1.6


 * Blood cultures--> 1 growing Gram - rods after 12H


 * Repeat Blood cultures --> no growth after 1 day


 * Rocephin started in ED--> D/C; there seems to be no clear source of infection


* Tylenol given in ED--> Continue PRN 


* CT Abdomen --> 1. No definitive sign of bile duct cancer 2. Proctitis 3. 

Bladder thickening that could be cystitis, wall hypertrophy, or other. 


* Levaquin 750 IV Q Daily and Flagyl  BID started tonight





Diarrhea, improved


* Most likely 2/2 above


* intermittent x2 weeks per wife; pt does not complain of any abdominal pain or 

tenderness


* No blood in stool


* Florastor and above antibiotics





Confusion


* Acute on Chronic (h/o Lewy Body Dementia); increased per wife 


* Risk factor: recent change in medications


 * Was recently started on Seroquel; per wife, seems to have started to cause 

Parkinsonian movements and hallucinations


 * Was in ED 8/24 for increased confusion and was taken off both Seroquel and 

Namenda by ED provider


* Wife would like to restart Namenda here as she states he seems worse without 

it


* Talked with his neurologist, Dr. Yoon, who agrees with the plan of 

starting Namenda and D/Cing Seroquel. Would like to start him on Nuplazid for 

Hallucinations and will write him a prescription. Wife does not want at this 

time. 


* Recommend f/u with PCP and Neurologist Dr. Yoon outpt





Resolved:


Dyspnea


* Was dyspneic on scene according to EMS, 93% RA


* No cough, CXR clear, Repeat CXR clear


* Now 100% RA





Chronic:


CHF


* 


* Continue Lasix and Spironolactone


Pacemaker


Lewy Body Dementia


Gout


Impaired vision


Hypothyroid


Anticoagulation therapy


Bile Duct CA s/p [place] 





Plan:


Transferred to medical floor


He remains stable and continues to improve clinically


Other orders as indicated above


Routine AM labs


Heart Healthy Diet


SW/CM for discharge planning --> may need SNF placement; also code status needs 

to be addressed--> Wife (POA) has chosen DNR/DNI


PT/OT 


DVT Prophylaxis: Warfarin


GI Prophylaxis: Pepcid


Ambulate with assistance


Code Status: DNR/DNI; PCP: Dr. Moran


Wife is POA

## 2018-08-29 RX ADMIN — METRONIDAZOLE SCH MLS/HR: 500 SOLUTION INTRAVENOUS at 08:26

## 2018-08-29 RX ADMIN — Medication SCH MG: at 08:29

## 2018-08-29 RX ADMIN — Medication SCH EACH: at 15:02

## 2018-08-29 RX ADMIN — Medication SCH MG: at 20:24

## 2018-08-29 RX ADMIN — METRONIDAZOLE SCH MLS/HR: 500 SOLUTION INTRAVENOUS at 20:27

## 2018-08-29 NOTE — PCM.PN
- General Info


Date of Service: 08/29/18


Admission Dx/Problem (Free Text): 


 Admission Diagnosis/Problem





Admission Diagnosis/Problem      Pneumonia








Subjective Update: 


In to see Taz. Apparently he has been agitated today with nursing, was 

throwing pillows and yelling at his wife. She states he hasn't been this 

agitated in quite some time. His wife took him outside and that seemed to help 

calm him down. I also told them that we may want to consider giving Ativan. The 

wife was reluctant at first, but has since decided that she is OK with a low 

dose. Other than that, Taz seems to be doing well clinically. He has no 

complaints at this time. Blood culture has come back with Klebsiella, which is 

susceptible to Levaquin. Will continue his IV Abx regimen for a few days and 

will most likely D/C Friday pending labs and clinical disposition. 


Functional Status: Reports: Pain Controlled, Tolerating Diet, Ambulating, 

Urinating





- Review of Systems


General: Reports: No Symptoms.  Denies: Fever, Chills


HEENT: Reports: No Symptoms


Pulmonary: Reports: No Symptoms.  Denies: Shortness of Breath, Cough


Cardiovascular: Reports: No Symptoms.  Denies: Chest Pain, Dyspnea on Exertion


Gastrointestinal: Reports: No Symptoms.  Denies: Abdominal Pain, Diarrhea, 

Nausea, Vomiting


Genitourinary: Reports: No Symptoms.  Denies: Dysuria, Frequency, Burning, Pain


Musculoskeletal: Reports: No Symptoms


Skin: Reports: No Symptoms


Neurological: Reports: Confusion (h/o Lewy Body Dementia)


Psychiatric: Reports: Confusion (h/o Lewy Body Dementia), Mood Lability, 

Agitation





- Patient Data


Vitals - Most Recent: 


 Last Vital Signs











Temp  98.8 F   08/29/18 15:05


 


Pulse  63   08/29/18 15:05


 


Resp  16   08/29/18 15:05


 


BP  142/69 H  08/29/18 15:05


 


Pulse Ox  96   08/29/18 15:05











Weight - Most Recent: 182 lb 12.8 oz


I&O - Last 24 Hours: 


 Intake & Output











 08/29/18 08/29/18 08/29/18





 06:59 14:59 22:59


 


Intake Total 500 520 


 


Output Total 1000  


 


Balance -500 520 











Lab Results Last 24 Hours: 


 Laboratory Results - last 24 hr











  08/29/18 08/29/18 08/29/18 Range/Units





  06:36 06:36 06:36 


 


WBC  7.16    (4.23-9.07)  K/mm3


 


RBC  4.38 L    (4.63-6.08)  M/mm3


 


Hgb  13.8    (13.7-17.5)  gm/L


 


Hct  40.4    (40.1-51.0)  %


 


MCV  92.2    (79.0-92.2)  fl


 


MCH  31.5    (25.7-32.2)  pg


 


MCHC  34.2    (32.2-35.5)  g/dl


 


RDW Std Deviation  44.5 H    (35.1-43.9)  fL


 


Plt Count  148 L    (163-337)  K/mm3


 


MPV  9.3 L    (9.4-12.3)  fl


 


Neut % (Auto)  73.7 H    (34.0-67.9)  %


 


Lymph % (Auto)  15.5 L    (21.8-53.1)  %


 


Mono % (Auto)  9.2    (5.3-12.2)  %


 


Eos % (Auto)  1.4    (0.8-7.0)  


 


Baso % (Auto)  0.1    (0.1-1.2)  %


 


Neut # (Auto)  5.27    (1.78-5.38)  K/mm3


 


Lymph # (Auto)  1.11 L    (1.32-3.57)  K/mm3


 


Mono # (Auto)  0.66    (0.30-0.82)  K/mm3


 


Eos # (Auto)  0.10    (0.04-0.54)  K/mm3


 


Baso # (Auto)  0.01    (0.01-0.08)  K/mm3


 


PT    19.4 H  (9.5-12.1)  SECONDS


 


INR    1.80  


 


Sodium   139   (136-145)  mEq/L


 


Potassium   3.9   (3.5-5.1)  mEq/L


 


Chloride   104   ()  mEq/L


 


Carbon Dioxide   28   (21-32)  mEq/L


 


Anion Gap   10.9   (5-15)  


 


BUN   16   (7-18)  mg/dL


 


Creatinine   1.3   (0.7-1.3)  mg/dL


 


Est Cr Clr Drug Dosing   43.08   mL/min


 


Estimated GFR (MDRD)   53   (>60)  mL/min


 


BUN/Creatinine Ratio   12.3 L   (14-18)  


 


Glucose   104   ()  mg/dL


 


Calcium   8.8   (8.5-10.1)  mg/dL


 


Magnesium   2.1   (1.8-2.4)  mg/dl


 


C-Reactive Protein   5.5 H*   (<1.0)  mg/dL











Jose Results Last 24 Hours: 


 Microbiology











 08/27/18 00:33 Aerobic Blood Culture - Preliminary





 Blood - Venous - Lab Draw    NO GROWTH AFTER 2 DAYS





 Anaerobic Blood Culture - Preliminary





    Klebsiella Pneumoniae


 


 08/28/18 05:04 Aerobic Blood Culture - Preliminary





 Blood - Venous - Lab Draw    NO GROWTH AFTER 1 DAY





 Anaerobic Blood Culture - Preliminary





    NO GROWTH AFTER 1 DAY


 


 08/28/18 04:54 Aerobic Blood Culture - Preliminary





 Blood - Venous    NO GROWTH AFTER 1 DAY





 Anaerobic Blood Culture - Preliminary





    NO GROWTH AFTER 1 DAY


 


 08/27/18 00:20 Aerobic Blood Culture - Preliminary





 Blood - Venous    NO GROWTH AFTER 2 DAYS





 Anaerobic Blood Culture - Preliminary





    NO GROWTH AFTER 2 DAYS











Med Orders - Current: 


 Current Medications





Acetaminophen (Tylenol)  650 mg PO Q4H PRN


   PRN Reason: Pain (Mild 1-3)/fever


Albuterol/Ipratropium (Duoneb 3.0-0.5 Mg/3 Ml)  3 ml NEB Q4H PRN


   PRN Reason: Shortness Of Breath/wheezing


Allopurinol (Zyloprim)  50 mg PO DAILY [facility]


   Last Admin: 08/29/18 08:30 Dose:  50 mg


Bisacodyl (Dulcolax)  5 mg PO DAILY PRN


   PRN Reason: Constipation


Docusate Sodium (Colace)  100 mg PO BID PRN


   PRN Reason: Constipation


Famotidine (Pepcid)  20 mg PO BID [facility]


   Last Admin: 08/29/18 08:30 Dose:  20 mg


Furosemide (Lasix)  60 mg PO DAILY [facility]


   Last Admin: 08/29/18 08:29 Dose:  60 mg


Hydralazine HCl (Apresoline)  10 mg IVPUSH Q6H PRN


   PRN Reason: Hypertension


Levofloxacin/Dextrose 750 mg/ (Premix)  150 mls @ 100 mls/hr IV Q48H [facility]


   Last Admin: 08/28/18 18:39 Dose:  100 mls/hr


Metronidazole 500 mg/ Premix  100 mls @ 100 mls/hr IV Q12H [facility]


   Last Admin: 08/29/18 08:26 Dose:  100 mls/hr


Levothyroxine Sodium (Synthroid)  50 mcg PO ACBREAKFAST [facility]


   Last Admin: 08/29/18 05:26 Dose:  50 mcg


Magnesium Sulfate (Pharmacy To Dose - Magnesium Replacement)  1 dose .XX 

ASDIRECTED [facility]


Memantine (Namenda)  5 mg PO BID [facility]


   Last Admin: 08/29/18 08:29 Dose:  5 mg


Metoprolol Succinate (Toprol Xl)  100 mg PO DAILY [facility]


   Last Admin: 08/29/18 08:31 Dose:  Not Given


Metoprolol Tartrate (Lopressor)  5 mg IVPUSH Q4H PRN


   PRN Reason: Tachycardia


Ondansetron HCl (Zofran Odt)  4 mg PO Q6H PRN


   PRN Reason: nausea, able to take PO


Ondansetron HCl (Zofran)  4 mg IV Q6H PRN


   PRN Reason: Nausea/Vomiting


Rivastigmine 9.5mg (Patch**Own Med**)  0 each TOP DAILY@1600 [facility]


   Last Admin: 08/29/18 15:02 Dose:  1 each


Polyethylene Glycol (Miralax)  17 gm PO DAILY PRN


   PRN Reason: Constipation


   Last Admin: 08/28/18 11:12 Dose:  17 gm


Potassium Chloride (Pharmacy To Dose - Potassium Replacement)  1 dose .XX 

ASDIRECTED [facility]


Saccharomyces Boulardii (Florastor)  250 mg PO BID [facility]


   Last Admin: 08/29/18 08:29 Dose:  250 mg


Senna/Docusate Sodium (Senna Plus)  1 tab PO BID PRN


   PRN Reason: Constipation


Sodium Chloride (Saline Flush)  10 ml FLUSH ASDIRECTED PRN


   PRN Reason: Keep Vein Open


Spironolactone (Aldactone)  25 mg PO DAILY [facility]


   Last Admin: 08/29/18 08:30 Dose:  25 mg


Warfarin Sodium (Pharmacy To Dose - Warfarin)  0 dose .XX ASDIRECTED PRN


   PRN Reason: RX TO DOSE WARFARIN


Warfarin Sodium (Coumadin)  10 mg PO DAILY@1800 [facility]


   Stop: 08/29/18 21:00





Discontinued Medications





Acetaminophen (Tylenol)  650 mg PO NOW ONE


   Stop: 08/27/18 01:03


   Last Admin: 08/27/18 01:09 Dose:  650 mg


Ceftriaxone Sodium 1 gm/ (Sodium Chloride)  100 mls @ 200 mls/hr IV ONETIME ONE


   Stop: 08/27/18 02:07


   Last Admin: 08/27/18 02:02 Dose:  200 mls/hr


Ceftriaxone Sodium 1 gm/ (Sodium Chloride)  100 mls @ 200 mls/hr IV Q24H [facility]


Magnesium Sulfate 2 gm/ Premix  50 mls @ 25 mls/hr IV ONETIME ONE


   Stop: 08/28/18 10:59


   Last Admin: 08/28/18 08:08 Dose:  25 mls/hr


Sodium Chloride (Normal Saline)  100 mls @ 60 mls/hr IV ASDIRECTED [facility]


   Stop: 08/28/18 18:24


   Last Admin: 08/28/18 16:53 Dose:  60 mls/hr


Iopamidol (Isovue-370 (76%))  100 ml IVPUSH ONETIME ONE


   Stop: 08/28/18 16:44


   Last Admin: 08/28/18 16:53 Dose:  80 ml


Rivastigmine [ (Rivastigmine] 1 Each)  0 each TOP DAILY [facility]


   Last Admin: 08/27/18 19:01 Dose:  Not Given


Potassium Chloride (Klor-Con M20)  40 meq PO ONETIME ONE


   Stop: 08/28/18 09:01


   Last Admin: 08/28/18 08:06 Dose:  40 meq


Sodium Chloride (Saline Flush)  10 ml FLUSH ONETIME ONE


   Stop: 08/28/18 16:44


   Last Admin: 08/28/18 16:53 Dose:  10 ml


Warfarin Sodium (Coumadin)  10 mg PO SuMoWeFrSa [facility]


Warfarin Sodium (Coumadin)  5 mg PO TuTh@1800 [facility]


Warfarin Sodium (Coumadin Sliding Scale)  0 each PO QPM [facility]


   Stop: 08/27/18 21:00


   Last Admin: 08/27/18 19:44 Dose:  Not Given


Warfarin Sodium (Coumadin)  10 mg PO DAILY@1800 [facility]


   Stop: 08/28/18 21:00


   Last Admin: 08/28/18 17:08 Dose:  10 mg











- Exam


Quality Assessment: DVT Prophylaxis.  No: Supplemental Oxygen


General: Alert, Cooperative, No Acute Distress.  No: Oriented (h/o Lewy Body 

Dementia)


HEENT: Pupils Equal, Pupils Reactive, EOMI, Mucous Membr. Moist/Pink


Neck: Supple


Lungs: Clear to Auscultation, Normal Respiratory Effort


Cardiovascular: Regular Rate, Regular Rhythm


GI/Abdominal Exam: Normal Bowel Sounds, Soft, Non-Tender, No Organomegaly, No 

Distention, No Abnormal Bruit, No Mass, Pelvis Stable


 (Male) Exam: Deferred


Back Exam: Normal Inspection, Full Range of Motion


Extremities: Normal Inspection, Normal Range of Motion, Non-Tender, No Pedal 

Edema, Normal Capillary Refill


Peripheral Pulses: 2+: Posterior Tibial (L), Posterior Tibial (R), Dorsalis 

Pedis (L), Dorsalis Pedis (R)


Skin: Warm, Dry, Intact


Neurological: No New Focal Deficit


Psy/Mental Status: Alert, Normal Affect, Labile Mood, Agitated (was agitated 

earlier today, yelled at wife and threw pillows at nursing)





- Problem List & Annotations


(1) Fever, unknown origin


SNOMED Code(s): 2268729


   Code(s): R50.9 - FEVER, UNSPECIFIED   Status: Acute   Priority: High   

Current Visit: Yes   





(2) Hypoxia


SNOMED Code(s): 066231523


   Code(s): R09.02 - HYPOXEMIA   Status: Resolved   Priority: High   Current 

Visit: No   





(3) Severe dementia


SNOMED Code(s): 53675319


   Code(s): F03.90 - UNSPECIFIED DEMENTIA WITHOUT BEHAVIORAL DISTURBANCE   

Status: Chronic   Priority: Low   Current Visit: Yes   





(4) Proctitis


SNOMED Code(s): 6184250


   Code(s): K62.89 - OTHER SPECIFIED DISEASES OF ANUS AND RECTUM   Status: 

Acute   Priority: High   Current Visit: Yes   





(5) Bladder wall thickening


SNOMED Code(s): 163376070


   Code(s): N32.89 - OTHER SPECIFIED DISORDERS OF BLADDER   Status: Acute   

Priority: High   Current Visit: Yes   





- Problem List Review


Problem List Initiated/Reviewed/Updated: Yes





- My Orders


Last 24 Hours: 


My Active Orders





08/28/18 18:00


Levofloxacin/Dextrose 5%-Water [Levaquin in D5W 750 MG/150 ML] 750 mg   Premix 

Bag 1 bag IV Q48H 





08/28/18 20:00


metroNIDAZOLE/Normal Saline [Flagyl 500 MG in  ML] 500 mg   Premix Bag 1 

bag IV Q12H 





08/28/18 21:00


Memantine [Namenda]   5 mg PO BID 














- Plan


Plan:: 





I/P:


Acute:





Proctitis and Possible Cystitis


* Fever resolved; 101.8 rectally in ED --> 98.2 now


* WBC 9.3--> 7.15, ESR 33, CRP 2.2--> 7.3--> 5.5


* U/A negative for UTI


* Sepsis Protocol:


 * Lactic Acid normal at 1.6


 * Blood cultures--> Klebsiella pneumoniae in 1 sample; susceptible to Levaquin

, continue current Abx regimen


 * Repeat Blood cultures --> no growth after 1 day


 * Rocephin started in ED--> D/C; there seems to be no clear source of infection


* Tylenol given in ED--> Continue PRN 


* CT Abdomen --> 1. No definitive sign of bile duct cancer 2. Proctitis 3. 

Bladder thickening that could be cystitis, wall hypertrophy, or other. 


* Levaquin 750 IV Q Daily and Flagyl  BID started tonight





Confusion


* Acute on Chronic (h/o Lewy Body Dementia); increased per wife 


* Risk factor: recent change in medications


 * Was recently started on Seroquel; per wife, seems to have started to cause 

Parkinsonian movements and hallucinations


 * Was in ED 8/24 for increased confusion and was taken off both Seroquel and 

Namenda by ED provider


* Wife would like to restart Namenda here as she states he seems worse without 

it


* Talked with his neurologist, Dr. Yoon, who agrees with the plan of 

starting Namenda and D/Cing Seroquel. Would like to start him on Nuplazid for 

Hallucinations and will write him a prescription. Wife does not want at this 

time. 


* Recommend f/u with PCP and Neurologist Dr. Yoon outpt





Resolved:


Dyspnea


* Was dyspneic on scene according to EMS, 93% RA


* No cough, CXR clear, Repeat CXR clear


* Now 100% RA





Diarrhea


* Most likely 2/2 above


* intermittent x2 weeks per wife; pt does not complain of any abdominal pain or 

tenderness


* No blood in stool


* Florastor and above antibiotics





Chronic:


CHF


* 


* Continue Lasix and Spironolactone


Pacemaker


Lewy Body Dementia


Gout


Impaired vision


Hypothyroid


Anticoagulation therapy


Bile Duct CA s/p Whipple 





Plan:


Transferred to medical floor


He remains stable and continues to improve clinically


Other orders as indicated above


Routine AM labs


Heart Healthy Diet


SW/CM for discharge planning --> may need SNF placement; also code status needs 

to be addressed--> Wife (POA) has chosen DNR/DNI


PT/OT 


DVT Prophylaxis: Warfarin


GI Prophylaxis: Pepcid


Ambulate with assistance


Code Status: DNR/DNI; PCP: Dr. Moran


Wife is POA





D/C Plan:


*  Will continue his IV Abx regimen for a few days and will most likely D/C 

Friday pending labs and clinical disposition.

## 2018-08-30 RX ADMIN — METRONIDAZOLE ONE MLS/HR: 500 SOLUTION INTRAVENOUS at 17:35

## 2018-08-30 RX ADMIN — METRONIDAZOLE ONE: 500 SOLUTION INTRAVENOUS at 17:39

## 2018-08-30 RX ADMIN — Medication SCH MG: at 08:33

## 2018-08-30 RX ADMIN — Medication SCH EACH: at 15:18

## 2018-08-30 RX ADMIN — METRONIDAZOLE SCH MLS/HR: 500 SOLUTION INTRAVENOUS at 08:35

## 2018-08-30 RX ADMIN — Medication SCH MG: at 20:58

## 2018-08-31 RX ADMIN — Medication SCH MG: at 09:09

## 2018-08-31 RX ADMIN — Medication SCH: at 01:55

## 2018-08-31 NOTE — CR
Chest: AP and lateral views of the chest were obtained.

 

Comparison: Prior chest x-ray of 08/27/18.

 

Heart size is within normal limits for AP technique.  Mild tortuosity 

of the thoracic aorta is seen.  Left-sided pacemaker is noted.  Lungs 

are clear with no acute parenchymal change.  Bony structures are 

within normal limits for the patient's age.

 

Impression:

1.  Nothing acute is seen on two-view chest x-ray.  Incidental 

findings as noted above.

 

Diagnostic code #2

 

Agree with preliminary report issued by [facility], report 

finalized on 08/28/18, 11:38 AM Central Time

## 2018-08-31 NOTE — [id number]
**Discharge Summary





- Hospital Course


HPI Initial Comments: 


This is an 78 yo male with past medical h/o CHF, pacemaker, Lewy Body Dementia, 

gout, impaired vision, hypothyroid, h/o bile duct CA s/p Whipple, 

anticoagulation therapy who comes in for fever of unknown origin and dyspnea. 

He was seen on 8/24 in the ED after getting a tooth pulled and he had increased 

confusion. ER doctor changed his medication to help lessen the confusion. 

Seroquel and Namenda were stopped, Lasix was increased from 40 to 60, and 

Sprinolactone was added.   No current pain. He denies F/C, SOB, cough, nausea, 

vomiting, diarrhea, chest pain, or GI/ complaints.





His symptoms improved after receiving O2 and antibiotics in the ED. His initial 

workup in the ED showed a CBC remarkable for WBC 9.3, RDW 45.5, MPV 9.2, Neut 87

% with no bandemia, Lymph 7%, ESR 33. His coagulation study shows PT 38.5, INR 

3.62. His chemistry is remarkable for BUN 22, Cr 1.5, GFR 45, Glu 141, Bili 1.3

, AST 68, Alk Phos 127, CRP 2.2, . U/A unimpressive for UTI. CXR read by 

ER physician as RUL PNA; formal read shows no acute abnormalities. 





He is subsequently admitted to the medical floor. He is Full code. His PCP is 

Dr. Moran.





Diagnosis: Stroke: No





- Discharge Data


Discharge Date: 08/31/18 (Admit date: 8/27/18)


Discharge Disposition: DC/Tfer to SNF 03


Condition: Good





- Discharge Diagnosis/Problem(s)


(1) Bladder wall thickening


SNOMED Code(s): 404135235


   ICD Code: N32.89 - OTHER SPECIFIED DISORDERS OF BLADDER   Status: Acute   

Priority: High   





(2) Fever, unknown origin


SNOMED Code(s): 3200147


   ICD Code: R50.9 - FEVER, UNSPECIFIED   Status: Acute   Priority: High   





(3) Proctitis


SNOMED Code(s): 8508687


   ICD Code: K62.89 - OTHER SPECIFIED DISEASES OF ANUS AND RECTUM   Status: 

Acute   Priority: High   





(4) Chronic congestive heart failure


SNOMED Code(s): 34036043


   ICD Code: I50.9 - HEART FAILURE, UNSPECIFIED   Status: Acute   


Qualifiers: 


   Heart failure type: diastolic   Qualified Code(s): I50.32 - Chronic 

diastolic (congestive) heart failure   





(5) Lewy body dementia


SNOMED Code(s): 976175790


   ICD Code: G31.83 - DEMENTIA WITH LEWY BODIES; F02.80 - DEMENTIA IN OTH 

DISEASES CLASSD ELSWHR W/O BEHAVRL DISTURB   Status: Acute   Priority: High   


Qualifiers: 


   Dementia behavioral disturbance: without behavioral disturbance   Qualified 

Code(s): G31.83 - Dementia with Lewy bodies; F02.80 - Dementia in other 

diseases classified elsewhere without behavioral disturbance   





- Patient Summary/Data


Consults: 


 Consultations





08/27/18 10:47


Consult to Case Management [CONS] Routine 


Consult to  [CONS] Routine 


Consult to Spiritual Care [CONS] Routine 


OT Evaluation and Treatment [CONS] Routine 


PT Evaluation and Treatment [CONS] Routine 


Respiratory Care Assess and Treatment [CONS] Routine 











Labs Pending at D/C: 


none





Recommended Follow-up Testing/Procedures: 


Follow-up with PCP within 7-10 days, sooner if needed





Follow-up with neurology as needed. 





Hospital Course: 


I/P:


Acute:


Proctitis and Possible Cystitis, Continues to Improved


* Fever resolved; 101.8 rectally in ED --> 98.2 now


* WBC 9.3--> 7.15 (Normal), ESR 33, CRP 2.2--> 7.3--> 5.5--> now at 4


* U/A negative for UTI


* Sepsis Protocol:


 * Lactic Acid normal at 1.6


 * Blood cultures--> Klebsiella pneumoniae in 1 sample; susceptible to Levaquin

, continue current Abx regimen


 * Repeat Blood cultures --> no growth after 3 days


 * Rocephin started in ED--> D/C; there seems to be no clear source of infection


* Tylenol given in ED--> Continue PRN 


* CT Abdomen --> 1. No definitive sign of bile duct cancer 2. Proctitis 3. 

Bladder thickening that could be cystitis, wall hypertrophy, or other. 


* Levaquin 750 IV Q 48hrs and Flagyl  BID started tonight--> d/c Levaquin 

and last dose of Flagyl tonight





Confusion--> (New environment and Medications)


* Acute on Chronic (h/o Lewy Body Dementia); increased per wife 


* Risk factor: recent change in medications


 * Was recently started on Seroquel; per wife, seems to have started to cause 

Parkinsonian movements and hallucinations


 * Was in ED 8/24 for increased confusion and was taken off both Seroquel and 

Namenda by ED provider


* Wife would like to restart Namenda here as she states he seems worse without 

it


* Talked with his neurologist, Dr. Yoon, who agrees with the plan of 

starting Namenda and D/Cing Seroquel. Would like to start him on Nuplazid for 

Hallucinations and will write him a prescription. Wife does not want at this 

time. 


* Recommend f/u with PCP and Neurologist Dr. Yoon outpt





Resolved:


Dyspnea


* Was dyspneic on scene according to EMS, 93% RA


* No cough, CXR clear, Repeat CXR clear


* Now 100% RA





Diarrhea


* Most likely 2/2 above


* intermittent x2 weeks per wife; pt does not complain of any abdominal pain or 

tenderness


* No blood in stool


* Florastor and above antibiotics





Chronic:


CHF


* 


* Continue Lasix and Spironolactone


Pacemaker


Lewy Body Dementia


Gout


Impaired vision


Hypothyroid


Anticoagulation therapy


Bile Duct CA s/p Whipple 





Plan:


He remains stable and continues to improve clinically


Other orders as indicated above


Routine AM labs


Will d/c IV Levaquin; He'll get his last dose of Flagyl at 1800 today then d/c


SW/CM for discharge planning --> may need SNF placement; also code status needs 

to be addressed--> Wife (POA) has chosen DNR/DNI


Continue PT/OT 


DVT Prophylaxis: Warfarin


GI Prophylaxis: Pepcid


Ambulate with assistance


Code Status: DNR/DNI; PCP: Dr. Moran


Wife is POA





Overall Taz did quite well. He only had one documented fever (101.8 rectally 

in the ED) and otherwise remained afebrile. He was reportedly having some 

respiratory distress prior to ED arrival and his workup here revealed nothing 

in terms of respiratory causes. UA was negative. Abdominal CT was obtained and 

suggested possible proctitis and bladder wall thickening. He was given rocephin 

in the ED. This was stopped as his labs looked good and CRP was minor. Once his 

CPR continued to elevate and the abdominal CT were obtained he was given one 

dose of levaquin and started on Flagyl for 3 days total. His medications were 

adjusted at a prior visit in the ED and his PCP, along with his neurologist 

were contacted while he was here. The decision was made to stop seroquel and 

restart his Namenda. His neurologist wanted to start him on Nuplazid, however 

the family ultimately refused this. His PCP agreed with the decision to stop 

antibiotics and recommended BID temperature checks for 10 days to ensure the 

patient does not have a infection building. He has not been requiring any 

oxygen. His confusion continued to wax and wane. PT/OT worked with him and the 

recommendation was made for SNF placement. He should continue to work with PT/

OT. They have been recommending a front wheeled walker as well. He will be 

discharged on a probiotic. Other home meds were continued with the changes as 

mentioned prior. Wife was aware of discharge plan and in agreement. He will be 

discharged to [facility] today. 








- Patient Instructions


Diet: Heart Healthy Diet


Activity: As Tolerated


Driving: Do Not Drive


Notify Provider of: Fever, Increased Pain, Nausea and/or Vomiting


Other/Special Instructions: -Follow-up with PCP within 7-10 days of discharge.  

-Follow-up with neurology as needed.  -Take all medications as prescribed.  -

Check temperature BID for next 10 days.  -Should symptoms return or worsen 

contact your primary care provider, come to a walk-in clinic, or return to the 

emergency room.





- Discharge Plan


*PRESCRIPTION DRUG MONITORING PROGRAM REVIEWED*: No


*COPY OF PRESCRIPTION DRUG MONITORING REPORT IN PATIENT MARISA: No


Prescriptions/Med Rec: 


Saccharomyces Boulardii [Florastor] 250 mg PO BID #60 cap


Home Medications: 


 Home Meds





Allopurinol [Zyloprim] 50 mg PO DAILY 08/24/18 [History]


Furosemide 60 mg PO DAILY 08/24/18 [History]


Levothyroxine [Synthroid] 50 mcg PO ACBREAKFAST 08/24/18 [History]


Metoprolol Succinate [Toprol XL 100mg] 100 mg PO DAILY 08/24/18 [History]


Rivastigmine 1 each TD DAILY 08/24/18 [History]


Spironolactone [Aldactone] 25 mg PO DAILY #21 tab 08/24/18 [Rx]


Warfarin Sodium [Jantoven] 10 mg PO DAILY 08/27/18 [History]


Memantine HCl 5 mg PO BID 08/28/18 [History]


Saccharomyces Boulardii [Florastor] 250 mg PO BID #60 cap 08/31/18 [Rx]








Patient Handouts:  Lewy Body Dementia


Referrals: 


Eliezer Moran MD [Primary Care Provider] -  (Please follow-up with your 

primary care doctor in 1 to 2 weeks of discharge. )





- Discharge Summary/Plan Comment


DC Time >30 min.: Yes (45 mins)





- General Info


Date of Service: 08/31/18


Admission Dx/Problem (Free Text: 


 Admission Diagnosis/Problem





Admission Diagnosis/Problem      Pneumonia








Subjective Update: 


In to see Taz. He had a good night and slept very well. He reports no pain 

or concerns. No nursing concerns. Family including wife, who is POA is at 

bedside. All are updated on progress and plan for discharge. Dr. Moran was 

contacted and agrees to discharge plan of stopping seroquel, continuing Namenda

, and no antibiotics on discharge. Will be discharged on probiotic. No concerns 

from family. He will be discharged today. 





Functional Status: Reports: Pain Controlled, Tolerating Diet, Ambulating, 

Urinating.  Denies: New Symptoms





- Review of Systems


General: Reports: Weakness.  Denies: Fever, Fatigue, Chills, Night Sweats


HEENT: Reports: No Symptoms.  Denies: Sore Throat


Pulmonary: Reports: No Symptoms.  Denies: Shortness of Breath, Cough, Sputum, 

Wheezing


Cardiovascular: Reports: No Symptoms.  Denies: Chest Pain, Palpitations, 

Dyspnea on Exertion


Gastrointestinal: Reports: No Symptoms.  Denies: Abdominal Pain, Constipation, 

Diarrhea, Difficulty Swallowing, Nausea, Vomiting


Genitourinary: Reports: No Symptoms.  Denies: Dysuria, Frequency, Burning, Pain


Musculoskeletal: Reports: No Symptoms


Skin: Reports: No Symptoms


Neurological: Reports: Confusion, Weakness.  Denies: Dizziness


Psychiatric: Reports: No Symptoms





- Patient Data


Vitals - Most Recent: 


 Last Vital Signs











Temp  99.3 F   08/31/18 06:50


 


Pulse  59 L  08/31/18 06:50


 


Resp  22 H  08/31/18 06:50


 


BP  124/80   08/31/18 06:50


 


Pulse Ox  95   08/31/18 06:50











Weight - Most Recent: 183 lb


I&O - Last 24 hours: 


 Intake & Output











 08/30/18 08/31/18 08/31/18





 22:59 06:59 14:59


 


Intake Total 960 300 


 


Output Total  1200 


 


Balance 960 -900 











Lab Results - Last 24 hrs: 


 Laboratory Results - last 24 hr











  08/31/18 08/31/18 08/31/18 Range/Units





  06:35 06:35 06:35 


 


WBC  6.72    (4.23-9.07)  K/mm3


 


RBC  4.46 L    (4.63-6.08)  M/mm3


 


Hgb  13.9    (13.7-17.5)  gm/L


 


Hct  40.8    (40.1-51.0)  %


 


MCV  91.5    (79.0-92.2)  fl


 


MCH  31.2    (25.7-32.2)  pg


 


MCHC  34.1    (32.2-35.5)  g/dl


 


RDW Std Deviation  43.4    (35.1-43.9)  fL


 


Plt Count  181    (163-337)  K/mm3


 


MPV  9.0 L    (9.4-12.3)  fl


 


Neut % (Auto)  66.9    (34.0-67.9)  %


 


Lymph % (Auto)  21.3 L    (21.8-53.1)  %


 


Mono % (Auto)  8.3    (5.3-12.2)  %


 


Eos % (Auto)  3.3    (0.8-7.0)  


 


Baso % (Auto)  0.1    (0.1-1.2)  %


 


Neut # (Auto)  4.49    (1.78-5.38)  K/mm3


 


Lymph # (Auto)  1.43    (1.32-3.57)  K/mm3


 


Mono # (Auto)  0.56    (0.30-0.82)  K/mm3


 


Eos # (Auto)  0.22    (0.04-0.54)  K/mm3


 


Baso # (Auto)  0.01    (0.01-0.08)  K/mm3


 


PT    24.5 H  (9.5-12.1)  SECONDS


 


INR    2.28  


 


Sodium   140   (136-145)  mEq/L


 


Potassium   3.7   (3.5-5.1)  mEq/L


 


Chloride   104   ()  mEq/L


 


Carbon Dioxide   29   (21-32)  mEq/L


 


Anion Gap   10.7   (5-15)  


 


BUN   17   (7-18)  mg/dL


 


Creatinine   1.4 H   (0.7-1.3)  mg/dL


 


Est Cr Clr Drug Dosing   40.00   mL/min


 


Estimated GFR (MDRD)   49   (>60)  mL/min


 


BUN/Creatinine Ratio   12.1 L   (14-18)  


 


Glucose   98   ()  mg/dL


 


Calcium   8.9   (8.5-10.1)  mg/dL


 


Magnesium   2.0   (1.8-2.4)  mg/dl


 


C-Reactive Protein   2.1 H*   (<1.0)  mg/dL











CATHERINE Results - Last 24 hrs: 


 Microbiology











 08/28/18 05:04 Aerobic Blood Culture - Preliminary





 Blood - Venous - Lab Draw    NO GROWTH AFTER 3 DAYS





 Anaerobic Blood Culture - Preliminary





    NO GROWTH AFTER 3 DAYS


 


 08/28/18 04:54 Aerobic Blood Culture - Preliminary





 Blood - Venous    NO GROWTH AFTER 3 DAYS





 Anaerobic Blood Culture - Preliminary





    NO GROWTH AFTER 3 DAYS


 


 08/27/18 00:20 Aerobic Blood Culture - Preliminary





 Blood - Venous    NO GROWTH AFTER 4 DAYS





 Anaerobic Blood Culture - Preliminary





    NO GROWTH AFTER 4 DAYS


 


 08/27/18 00:33 Aerobic Blood Culture - Preliminary





 Blood - Venous - Lab Draw    NO GROWTH AFTER 4 DAYS





 Anaerobic Blood Culture - Final





    Klebsiella Pneumoniae











Med Orders - Current: 


 Current Medications





Acetaminophen (Tylenol)  650 mg PO Q4H PRN


   PRN Reason: Pain (Mild 1-3)/fever


Albuterol/Ipratropium (Duoneb 3.0-0.5 Mg/3 Ml)  3 ml NEB Q4H PRN


   PRN Reason: Shortness Of Breath/wheezing


Allopurinol (Zyloprim)  50 mg PO DAILY [facility]


   Last Admin: 08/30/18 08:34 Dose:  50 mg


Bisacodyl (Dulcolax)  5 mg PO DAILY PRN


   PRN Reason: Constipation


Docusate Sodium (Colace)  100 mg PO BID PRN


   PRN Reason: Constipation


Famotidine (Pepcid)  20 mg PO BID [facility]


   Last Admin: 08/31/18 01:56 Dose:  Not Given


Furosemide (Lasix)  60 mg PO DAILY [facility]


   Last Admin: 08/30/18 08:34 Dose:  60 mg


Hydralazine HCl (Apresoline)  10 mg IVPUSH Q6H PRN


   PRN Reason: Hypertension


Levothyroxine Sodium (Synthroid)  50 mcg PO ACBREAKFAST [facility]


   Last Admin: 08/30/18 06:21 Dose:  50 mcg


Lorazepam (Ativan)  0.25 mg PO Q4H PRN


   PRN Reason: Anxiety


   Last Admin: 08/30/18 00:33 Dose:  0.25 mg


Lorazepam (Ativan)  0.25 mg IVPUSH Q4H PRN


   PRN Reason: Anxiety


Magnesium Sulfate (Pharmacy To Dose - Magnesium Replacement)  1 dose .XX 

ASDIRECTED [facility]


Memantine (Namenda)  5 mg PO BID [facility]


   Last Admin: 08/31/18 01:56 Dose:  Not Given


Metoprolol Succinate (Toprol Xl)  100 mg PO DAILY [facility]


   Last Admin: 08/30/18 10:32 Dose:  Not Given


Metoprolol Tartrate (Lopressor)  5 mg IVPUSH Q4H PRN


   PRN Reason: Tachycardia


Ondansetron HCl (Zofran Odt)  4 mg PO Q6H PRN


   PRN Reason: nausea, able to take PO


Ondansetron HCl (Zofran)  4 mg IV Q6H PRN


   PRN Reason: Nausea/Vomiting


Rivastigmine 9.5mg (Patch**Own Med**)  0 each TOP DAILY@1600 [facility]


   Last Admin: 08/30/18 15:18 Dose:  1 each


Polyethylene Glycol (Miralax)  17 gm PO DAILY PRN


   PRN Reason: Constipation


   Last Admin: 08/28/18 11:12 Dose:  17 gm


Potassium Chloride (Pharmacy To Dose - Potassium Replacement)  1 dose .XX 

ASDIRECTED [facility]


Saccharomyces Boulardii (Florastor)  250 mg PO BID [facility]


   Last Admin: 08/31/18 01:55 Dose:  Not Given


Senna/Docusate Sodium (Senna Plus)  1 tab PO BID PRN


   PRN Reason: Constipation


Sodium Chloride (Saline Flush)  10 ml FLUSH ASDIRECTED PRN


   PRN Reason: Keep Vein Open


Spironolactone (Aldactone)  25 mg PO DAILY [facility]


   Last Admin: 08/30/18 08:34 Dose:  25 mg


Warfarin Sodium (Pharmacy To Dose - Warfarin)  0 dose .XX ASDIRECTED PRN


   PRN Reason: RX TO DOSE WARFARIN





Discontinued Medications





Acetaminophen (Tylenol)  650 mg PO NOW ONE


   Stop: 08/27/18 01:03


   Last Admin: 08/27/18 01:09 Dose:  650 mg


Ceftriaxone Sodium 1 gm/ (Sodium Chloride)  100 mls @ 200 mls/hr IV ONETIME ONE


   Stop: 08/27/18 02:07


   Last Admin: 08/27/18 02:02 Dose:  200 mls/hr


Ceftriaxone Sodium 1 gm/ (Sodium Chloride)  100 mls @ 200 mls/hr IV Q24H [facility]


Magnesium Sulfate 2 gm/ Premix  50 mls @ 25 mls/hr IV ONETIME ONE


   Stop: 08/28/18 10:59


   Last Admin: 08/28/18 08:08 Dose:  25 mls/hr


Sodium Chloride (Normal Saline)  100 mls @ 60 mls/hr IV ASDIRECTED [facility]


   Stop: 08/28/18 18:24


   Last Admin: 08/28/18 16:53 Dose:  60 mls/hr


Levofloxacin/Dextrose 750 mg/ (Premix)  150 mls @ 100 mls/hr IV Q48H [facility]


   Last Admin: 08/28/18 18:39 Dose:  100 mls/hr


Metronidazole 500 mg/ Premix  100 mls @ 100 mls/hr IV Q12H [facility]


   Last Admin: 08/30/18 08:35 Dose:  100 mls/hr


Metronidazole 500 mg/ Premix  100 mls @ 100 mls/hr IV ONETIME ONE


   Stop: 08/30/18 18:59


   Last Admin: 08/30/18 17:39 Dose:  Not Given


Iopamidol (Isovue-370 (76%))  100 ml IVPUSH ONETIME ONE


   Stop: 08/28/18 16:44


   Last Admin: 08/28/18 16:53 Dose:  80 ml


Lorazepam (Ativan) Confirm Administered Dose 2 mg .ROUTE .STK-MED ONE


   Stop: 08/29/18 23:47


   Last Admin: 08/30/18 06:14 Dose:  Not Given


Rivastigmine [ (Rivastigmine] 1 Each)  0 each TOP DAILY [facility]


   Last Admin: 08/27/18 19:01 Dose:  Not Given


Potassium Chloride (Klor-Con M20)  40 meq PO ONETIME ONE


   Stop: 08/28/18 09:01


   Last Admin: 08/28/18 08:06 Dose:  40 meq


Sodium Chloride (Saline Flush)  10 ml FLUSH ONETIME ONE


   Stop: 08/28/18 16:44


   Last Admin: 08/28/18 16:53 Dose:  10 ml


Warfarin Sodium (Coumadin)  10 mg PO SuMoWeFrSa [facility]


Warfarin Sodium (Coumadin)  5 mg PO TuTh@1800 [facility]


Warfarin Sodium (Coumadin Sliding Scale)  0 each PO QPM [facility]


   Stop: 08/27/18 21:00


   Last Admin: 08/27/18 19:44 Dose:  Not Given


Warfarin Sodium (Coumadin)  10 mg PO DAILY@1800 [facility]


   Stop: 08/28/18 21:00


   Last Admin: 08/28/18 17:08 Dose:  10 mg


Warfarin Sodium (Coumadin)  10 mg PO DAILY@1800 [facility]


   Stop: 08/29/18 21:00


   Last Admin: 08/29/18 17:46 Dose:  10 mg


Warfarin Sodium (Coumadin)  5 mg PO ONETIME ONE


   Stop: 08/30/18 18:01


   Last Admin: 08/30/18 17:34 Dose:  5 mg











- Exam


Quality Assessment: Reports: Supplemental Oxygen, DVT Prophylaxis


General: Reports: Alert, Cooperative, No Acute Distress.  Denies: Oriented


HEENT: Reports: Pupils Equal, Pupils Reactive, EOMI, Mucous Membr. Moist/Pink


Neck: Reports: Supple, Trachea Midline, No JVD


Lungs: Reports: Clear to Auscultation, Normal Respiratory Effort


Cardiovascular: Reports: Regular Rate, Regular Rhythm, Other (pacemaker)


GI/Abdominal Exam: Normal Bowel Sounds, Soft, Non-Tender, No Distention


 (Male) Exam: Deferred


Rectal (Males) Exam: Deferred


Extremities: Normal Inspection, Normal Range of Motion, Non-Tender, No Pedal 

Edema, Normal Capillary Refill


Skin: Reports: Warm, Dry, Intact


Neurological: Reports: No New Focal Deficit


Psy/Mental Status: Reports: Alert, Normal Affect, Normal Mood.  Denies: Anxious

, Depressed, Agitated

## 2020-10-18 NOTE — EDM.PDOC
ED HPI GENERAL MEDICAL PROBLEM





- General


Chief Complaint: Respiratory Problem


Stated Complaint: [facility]


Time Seen by Provider: 10/18/20 09:41





- History of Present Illness


INITIAL COMMENTS - FREE TEXT/NARRATIVE: 





80-year-old male presents to the emergency room with decreased alertness and a 

cough.





Patient has advanced dementia, he has Lewy body disease.  Over the last 24 to 36

hours the patient has become a little less responsive.  He has a developing  a 

cough.  He resides at the [facility].  No other significant 

history.  The patient is unable to talk or communicate.





- Related Data


                                    Allergies











Allergy/AdvReac Type Severity Reaction Status Date / Time


 


procaine Allergy Unknown unknown Verified 10/18/20 09:42











Home Meds: 


                                    Home Meds





Furosemide 60 mg PO DAILY 08/24/18 [History]


Levothyroxine [Synthroid] 50 mcg PO ACBREAKFAST 08/24/18 [History]


allopurinoL [Zyloprim] 50 mg PO DAILY 08/24/18 [History]


Warfarin Sodium [Jantoven] 7.5 mg PO SUTUTHSA 08/27/18 [History]


Acetaminophen [Pain Reliever] 650 mg PO DAILY PRN 10/19/18 [History]


Sennosides/Docusate Sodium [Senna Plus Tablet] 1 tab PO DAILY PRN 10/19/18 

[History]


Warfarin Sodium [Coumadin] 10 mg PO MOWEFR 10/19/18 [History]


Brimonidine Tartrate [Lumify] 1 drop EYEBOTH DAILY PRN 10/18/20 [History]


Magnesium Hydroxide [Milk of Magnesia] 30 ml PO TID PRN 10/18/20 [History]


Metoprolol Tartrate [Lopressor] 25 mg PO DAILY 10/18/20 [History]


Propylene Glycol//Pf [Systane 0.3-0.4% Eye Drop] 1 drop EYEBOTH BID PRN 

10/18/20 [History]


Sennosides [Senna] 8.6 mg PO BID 10/18/20 [History]


Trolamine Salicylate/Aloe Vera [Aspercreme 10%] 1 applic TOP BID 10/18/20 

[History]


bisacodyL [Bisacodyl] 10 mg RECTAL DAILY PRN 10/18/20 [History]


polyethylene glycoL 3350 [MiraLAX] 17 gm PO DAILY 10/18/20 [History]











Past Medical History


HEENT History: Reports: Impaired Vision


Other HEENT History: wears glasses


Cardiovascular History: Reports: Heart Failure, Pacemaker


Respiratory History: Reports: Asthma


Gastrointestinal History: Reports: Colon Polyp


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 

resection of bile duct. head of pancreas was removed or reconstructed. new bile 

duct is made out of intestinal tract


Genitourinary History: Reports: Other (See Below)


Other Genitourinary History: lately patient has had some incontinence issues


Musculoskeletal History: Reports: Gout


Neurological History: Reports: Other (See Below)


Other Neuro History: lewie body dementia, aggressive behaviors


Psychiatric History: Reports: Dementia, Other (See Below)


Other Psychiatric History: agressive behaviors with dementia


Endocrine/Metabolic History: Reports: Hypothyroidism


Hematologic History: Reports: Anticoagulation Therapy


Other Oncologic History: bile duct 10 years ago


Dermatologic History: Reports: Other (See Below)


Other Dermatologic History: discolored lower legs bilaterally, has had this for 

a long time





- Past Surgical History


HEENT Surgical History: Reports: Other (See Below)


Other HEENT Surgeries/Procedures: teeth pulled





Social & Family History





- Family History


Family Medical History: Noncontributory





- Caffeine Use


Caffeine Use: Reports: Coffee





- Living Situation & Occupation


Living situation: Reports: , with Family (Wife cares for him.)


Occupation: Retired





ED ROS GENERAL





- Review of Systems


Review Of Systems: Unable To Obtain


Reason Not Obtained: Advanced dementia decreased LOC





ED EXAM, GENERAL





- Physical Exam


Exam: See Below


Exam Limited By: No Limitations


General Appearance: Alert, No Apparent Distress, Other (Patient is tachypneic 

otherwise in no acute distress he does not respond to any verbal stimuli)


Eye Exam: Bilateral Eye: Normal Inspection


Ears: Normal External Exam, Normal Canal, Hearing Grossly Normal, Normal TMs


Nose: Normal Inspection, Normal Mucosa, No Blood


Throat/Mouth: Normal Inspection, Normal Lips, Normal Teeth


Head: Atraumatic, Normocephalic


Neck: Normal Inspection.  No: Lymphadenopathy (L), Lymphadenopathy (R)


Respiratory/Chest: Decreased Breath Sounds, Crackles, Other (Monitoring friction

 rub noted especially in the left base however this is heard in the right side 

as well).  No: Respiratory Distress


Cardiovascular: Normal Peripheral Pulses, Regular Rate, Rhythm, No Edema, No 

Murmur


GI/Abdominal: Normal Bowel Sounds, Soft, Non-Tender


Back Exam: Normal Inspection.  No: CVA Tenderness (L), CVA Tenderness (R)


Extremities: Normal Inspection, No Pedal Edema





Course





- Vital Signs


Last Recorded V/S: 


                                Last Vital Signs











Temp  37.2 C   10/18/20 09:38


 


Pulse  68   10/18/20 09:38


 


Resp  34 H  10/18/20 09:38


 


BP  126/82   10/18/20 09:38


 


Pulse Ox  91 L  10/18/20 10:21














- Orders/Labs/Meds


Orders: 


                               Active Orders 24 hr











 Category Date Time Status


 


 EKG Documentation Completion [RC] STAT Care  10/18/20 10:01 Active


 


 RT Aerosol Therapy [RC] ASDIRECTED Care  10/18/20 10:04 Active


 


 Chest 1V Frontal [CR] Stat Exams  10/18/20 10:04 Taken


 


 CULTURE BLOOD [BC] Stat Lab  10/18/20 10:25 Received


 


 CULTURE BLOOD [BC] Stat Lab  10/18/20 10:32 Received


 


 Oseltamivir [Tamiflu] Med  10/18/20 14:13 Once





 30 mg PO ONETIME ONE   


 


 Blood Culture x2 Reflex Set [OM.PC] Stat Oth  10/18/20 10:01 Ordered


 


 Isolation [COMM] Routine Oth  10/18/20 13:09 Ordered








                                Medication Orders





Oseltamivir Phosphate (Tamiflu)  30 mg PO ONETIME ONE


   Stop: 10/18/20 14:14








Labs: 


                                Laboratory Tests











  10/18/20 10/18/20 10/18/20 Range/Units





  10:00 10:00 10:00 


 


WBC  9.28 H    (4.23-9.07)  K/mm3


 


RBC  4.61 L    (4.63-6.08)  M/mm3


 


Hgb  14.2    (13.7-17.5)  gm/dl


 


Hct  43.6    (40.1-51.0)  %


 


MCV  94.6 H D    (79.0-92.2)  fl


 


MCH  30.8    (25.7-32.2)  pg


 


MCHC  32.6    (32.2-35.5)  g/dl


 


RDW Std Deviation  47.9 H    (35.1-43.9)  fL


 


Plt Count  174    (163-337)  K/mm3


 


MPV  8.7 L    (9.4-12.3)  fl


 


Neutrophils % (Manual)  77 H    (40-60)  %


 


Band Neutrophils %  2    (0-10)  %


 


Lymphocytes % (Manual)  17 L    (20-40)  %


 


Atypical Lymphs %  0    %


 


Monocytes % (Manual)  3    (2-10)  %


 


Eosinophils % (Manual)  1    (0.8-7.0)  %


 


Basophils % (Manual)  0 L    (0.2-1.2)  


 


Platelet Estimate  Adequate    


 


RBC Morph Comment  Normal    


 


PT   27.4 H   (9.7-11.7)  SECONDS


 


INR   2.61   


 


APTT   38 H   (22-31)  SECONDS


 


D-Dimer, Quantitative     (0.19-0.50)  mg/L


 


Sodium    141  (136-145)  mEq/L


 


Potassium    4.1  (3.5-5.1)  mEq/L


 


Chloride    103  ()  mEq/L


 


Carbon Dioxide    30  (21-32)  mEq/L


 


Anion Gap    12.1  (5-15)  


 


BUN    19 H  (7-18)  mg/dL


 


Creatinine    1.1  (0.7-1.3)  mg/dL


 


Est Cr Clr Drug Dosing    51.78  mL/min


 


Estimated GFR (MDRD)    > 60  (>60)  mL/min


 


BUN/Creatinine Ratio    17.3  (14-18)  


 


Glucose    104  ()  mg/dL


 


Lactic Acid     (0.4-2.0)  mmol/L


 


Calcium    8.8  (8.5-10.1)  mg/dL


 


Ferritin     ()  ng/ml


 


Total Bilirubin    2.2 H  (0.2-1.0)  mg/dL


 


AST    32  (15-37)  U/L


 


ALT    38  (16-63)  U/L


 


Alkaline Phosphatase    156 H  ()  U/L


 


Lactate Dehydrogenase    162  ()  U/L


 


Troponin I    0.021  (0.00-0.056)  ng/mL


 


C-Reactive Protein    4.8 H*  (<1.0)  mg/dL


 


Total Protein    7.3  (6.4-8.2)  g/dl


 


Albumin    3.3 L  (3.4-5.0)  g/dl


 


Globulin    4.0  gm/dL


 


Albumin/Globulin Ratio    0.8 L  (1-2)  


 


Urine Color     (Yellow)  


 


Urine Appearance     (Clear)  


 


Urine pH     (5.0-8.0)  


 


Ur Specific Gravity     (1.005-1.030)  


 


Urine Protein     (Negative)  


 


Urine Glucose (UA)     (Negative)  


 


Urine Ketones     (Negative)  


 


Urine Occult Blood     (Negative)  


 


Urine Nitrite     (Negative)  


 


Urine Bilirubin     (Negative)  


 


Urine Urobilinogen     (0.2-1.0)  


 


Ur Leukocyte Esterase     (Negative)  


 


Urine RBC     (0-5)  /hpf


 


Urine WBC     (0-5)  /hpf


 


Ur Squamous Epith Cells     (0-5)  /hpf


 


Urine Bacteria     (FEW)  /hpf


 


Urine Mucus     (FEW)  /hpf


 


SARS-CoV-2 RNA (QUINN)     (NEGATIVE)  














  10/18/20 10/18/20 10/18/20 Range/Units





  10:00 10:00 10:00 


 


WBC     (4.23-9.07)  K/mm3


 


RBC     (4.63-6.08)  M/mm3


 


Hgb     (13.7-17.5)  gm/dl


 


Hct     (40.1-51.0)  %


 


MCV     (79.0-92.2)  fl


 


MCH     (25.7-32.2)  pg


 


MCHC     (32.2-35.5)  g/dl


 


RDW Std Deviation     (35.1-43.9)  fL


 


Plt Count     (163-337)  K/mm3


 


MPV     (9.4-12.3)  fl


 


Neutrophils % (Manual)     (40-60)  %


 


Band Neutrophils %     (0-10)  %


 


Lymphocytes % (Manual)     (20-40)  %


 


Atypical Lymphs %     %


 


Monocytes % (Manual)     (2-10)  %


 


Eosinophils % (Manual)     (0.8-7.0)  %


 


Basophils % (Manual)     (0.2-1.2)  


 


Platelet Estimate     


 


RBC Morph Comment     


 


PT     (9.7-11.7)  SECONDS


 


INR     


 


APTT     (22-31)  SECONDS


 


D-Dimer, Quantitative   0.26   (0.19-0.50)  mg/L


 


Sodium     (136-145)  mEq/L


 


Potassium     (3.5-5.1)  mEq/L


 


Chloride     ()  mEq/L


 


Carbon Dioxide     (21-32)  mEq/L


 


Anion Gap     (5-15)  


 


BUN     (7-18)  mg/dL


 


Creatinine     (0.7-1.3)  mg/dL


 


Est Cr Clr Drug Dosing     mL/min


 


Estimated GFR (MDRD)     (>60)  mL/min


 


BUN/Creatinine Ratio     (14-18)  


 


Glucose     ()  mg/dL


 


Lactic Acid  0.8    (0.4-2.0)  mmol/L


 


Calcium     (8.5-10.1)  mg/dL


 


Ferritin    650 H  ()  ng/ml


 


Total Bilirubin     (0.2-1.0)  mg/dL


 


AST     (15-37)  U/L


 


ALT     (16-63)  U/L


 


Alkaline Phosphatase     ()  U/L


 


Lactate Dehydrogenase     ()  U/L


 


Troponin I     (0.00-0.056)  ng/mL


 


C-Reactive Protein     (<1.0)  mg/dL


 


Total Protein     (6.4-8.2)  g/dl


 


Albumin     (3.4-5.0)  g/dl


 


Globulin     gm/dL


 


Albumin/Globulin Ratio     (1-2)  


 


Urine Color     (Yellow)  


 


Urine Appearance     (Clear)  


 


Urine pH     (5.0-8.0)  


 


Ur Specific Gravity     (1.005-1.030)  


 


Urine Protein     (Negative)  


 


Urine Glucose (UA)     (Negative)  


 


Urine Ketones     (Negative)  


 


Urine Occult Blood     (Negative)  


 


Urine Nitrite     (Negative)  


 


Urine Bilirubin     (Negative)  


 


Urine Urobilinogen     (0.2-1.0)  


 


Ur Leukocyte Esterase     (Negative)  


 


Urine RBC     (0-5)  /hpf


 


Urine WBC     (0-5)  /hpf


 


Ur Squamous Epith Cells     (0-5)  /hpf


 


Urine Bacteria     (FEW)  /hpf


 


Urine Mucus     (FEW)  /hpf


 


SARS-CoV-2 RNA (QUINN)     (NEGATIVE)  














  10/18/20 10/18/20 Range/Units





  11:00 11:35 


 


WBC    (4.23-9.07)  K/mm3


 


RBC    (4.63-6.08)  M/mm3


 


Hgb    (13.7-17.5)  gm/dl


 


Hct    (40.1-51.0)  %


 


MCV    (79.0-92.2)  fl


 


MCH    (25.7-32.2)  pg


 


MCHC    (32.2-35.5)  g/dl


 


RDW Std Deviation    (35.1-43.9)  fL


 


Plt Count    (163-337)  K/mm3


 


MPV    (9.4-12.3)  fl


 


Neutrophils % (Manual)    (40-60)  %


 


Band Neutrophils %    (0-10)  %


 


Lymphocytes % (Manual)    (20-40)  %


 


Atypical Lymphs %    %


 


Monocytes % (Manual)    (2-10)  %


 


Eosinophils % (Manual)    (0.8-7.0)  %


 


Basophils % (Manual)    (0.2-1.2)  


 


Platelet Estimate    


 


RBC Morph Comment    


 


PT    (9.7-11.7)  SECONDS


 


INR    


 


APTT    (22-31)  SECONDS


 


D-Dimer, Quantitative    (0.19-0.50)  mg/L


 


Sodium    (136-145)  mEq/L


 


Potassium    (3.5-5.1)  mEq/L


 


Chloride    ()  mEq/L


 


Carbon Dioxide    (21-32)  mEq/L


 


Anion Gap    (5-15)  


 


BUN    (7-18)  mg/dL


 


Creatinine    (0.7-1.3)  mg/dL


 


Est Cr Clr Drug Dosing    mL/min


 


Estimated GFR (MDRD)    (>60)  mL/min


 


BUN/Creatinine Ratio    (14-18)  


 


Glucose    ()  mg/dL


 


Lactic Acid    (0.4-2.0)  mmol/L


 


Calcium    (8.5-10.1)  mg/dL


 


Ferritin    ()  ng/ml


 


Total Bilirubin    (0.2-1.0)  mg/dL


 


AST    (15-37)  U/L


 


ALT    (16-63)  U/L


 


Alkaline Phosphatase    ()  U/L


 


Lactate Dehydrogenase    ()  U/L


 


Troponin I    (0.00-0.056)  ng/mL


 


C-Reactive Protein    (<1.0)  mg/dL


 


Total Protein    (6.4-8.2)  g/dl


 


Albumin    (3.4-5.0)  g/dl


 


Globulin    gm/dL


 


Albumin/Globulin Ratio    (1-2)  


 


Urine Color  Dark yellow   (Yellow)  


 


Urine Appearance  Clear   (Clear)  


 


Urine pH  7.0   (5.0-8.0)  


 


Ur Specific Gravity  1.025   (1.005-1.030)  


 


Urine Protein  1+ H   (Negative)  


 


Urine Glucose (UA)  Negative   (Negative)  


 


Urine Ketones  Negative   (Negative)  


 


Urine Occult Blood  Negative   (Negative)  


 


Urine Nitrite  Negative   (Negative)  


 


Urine Bilirubin  Negative   (Negative)  


 


Urine Urobilinogen  2.0 H   (0.2-1.0)  


 


Ur Leukocyte Esterase  Negative   (Negative)  


 


Urine RBC  0-5   (0-5)  /hpf


 


Urine WBC  Not seen   (0-5)  /hpf


 


Ur Squamous Epith Cells  0-5   (0-5)  /hpf


 


Urine Bacteria  Rare   (FEW)  /hpf


 


Urine Mucus  Few   (FEW)  /hpf


 


SARS-CoV-2 RNA (QUINN)   Negative  (NEGATIVE)  











Meds: 


Medications











Generic Name Dose Route Start Last Admin





  Trade Name Freq  PRN Reason Stop Dose Admin


 


Oseltamivir Phosphate  30 mg  10/18/20 14:13 





  Tamiflu  PO  10/18/20 14:14 





  ONETIME ONE  














Discontinued Medications














Generic Name Dose Route Start Last Admin





  Trade Name Abraham  PRN Reason Stop Dose Admin


 


Albuterol/Ipratropium  3 ml  10/18/20 10:04  10/18/20 10:20





  Duoneb 3.0-0.5 Mg/3 Ml  NEB  10/18/20 10:05  3 ml





  ONETIME ONE   Administration


 


Lorazepam  0.5 mg  10/18/20 10:24  10/18/20 10:48





  Ativan  IVPUSH  10/18/20 10:25  0.5 mg





  ONETIME ONE   Administration


 


Morphine Sulfate  1 mg  10/18/20 10:24  10/18/20 10:48





  Morphine  IVPUSH  10/18/20 10:25  1 mg





  ONETIME ONE   Administration














- Re-Assessments/Exams


Free Text/Narrative Re-Assessment/Exam: 





10/18/20 10:12


I had the opportunity to discuss the patient's care plan with the patient's 

wife.  They are leaning more towards comfort care if something is treatable 

easily they would like it treated but absolutely no aggressive measures.


And C-reactive protein are elevated/18/20 14:15


X-ray is unremarkable for acute disease he has some cardiomegaly and some 

chronic lung changes.  Laboratory evaluation is remarkable for a positive 

influenza B.  Based on his renal function be started on Tamiflu 30 mg twice 

daily.  His ferritin and C-reactive protein are elevated LDH is normal.  White 

count is minimally elevated.  Troponin negative INR is therapeutic he is on 

warfarin.  With his tachypnea I discussed the patient's case with Dr. Hartley, our

 hospitalist the patient did respond favorably to nebulizers here we will put 

him on observation for neb treatment anticipate discharge to the nursing home 

soon.























Departure





- Departure


Time of Disposition: 14:17


Disposition: Home, Self-Care 01


Clinical Impression: 


 Influenza B








- Discharge Information


Referrals: 


PCP,None [Ordering Only Provider] - 


Forms:  ED Department Discharge





Sepsis Event Note (ED)





- Focused Exam


Vital Signs: 


                                   Vital Signs











  Temp Pulse Resp BP Pulse Ox Pulse Ox


 


 10/18/20 10:21       91 L


 


 10/18/20 09:38  37.2 C  68  34 H  126/82  94 L 














- My Orders


Last 24 Hours: 


My Active Orders





10/18/20 10:01


EKG Documentation Completion [RC] STAT 


Blood Culture x2 Reflex Set [OM.PC] Stat 





10/18/20 10:04


RT Aerosol Therapy [RC] ASDIRECTED 


Chest 1V Frontal [CR] Stat 





10/18/20 10:25


CULTURE BLOOD [BC] Stat 





10/18/20 10:32


CULTURE BLOOD [BC] Stat 





10/18/20 13:09


Isolation [COMM] Routine 





10/18/20 14:13


Oseltamivir [Tamiflu]   30 mg PO ONETIME ONE 














- Assessment/Plan


Last 24 Hours: 


My Active Orders





10/18/20 10:01


EKG Documentation Completion [RC] STAT 


Blood Culture x2 Reflex Set [OM.PC] Stat 





10/18/20 10:04


RT Aerosol Therapy [RC] ASDIRECTED 


Chest 1V Frontal [CR] Stat 





10/18/20 10:25


CULTURE BLOOD [BC] Stat 





10/18/20 10:32


CULTURE BLOOD [BC] Stat 





10/18/20 13:09


Isolation [COMM] Routine 





10/18/20 14:13


Oseltamivir [Tamiflu]   30 mg PO ONETIME ONE

## 2020-10-18 NOTE — PCM.HP.2
H&P History of Present Illness





- General


Date of Service: 10/18/20


Admit Problem/Dx: 


Patient is from nursing home with reduced alertness and cough.


Source of Information: EMS Notes Reviewed, Old Records


History Limitations: Reports: Other (Dementia)





- History of Present Illness


Initial Comments - Free Text/Narative: 





The patient is an 80-year-old gentleman who had presented to the emergency 

department from nursing home with decreased alertness and a cough.  The patient 

has advanced Lewy body dementia and he has been unable to participate in any 

meaningful way with his history and physical.  The patient is essentially 

nonverbal.  Information has been obtained from the charting as well as previous 

medical records.  Patient's family is not available.  Apparently over the past 

24 to 36 hours the patient became less responsive.  He has a cough.  The patient

does have a history of congestive heart failure and he is currently 

anticoagulated chronically with warfarin with a therapeutic INR.  The patient 

also has multiple medications for constipation.  Patient also has a pacemaker in

place.


Onset of Symptoms: Reports: Unknown/Unsure


Duration of Symptoms: Reports: Day(s):





- Related Data


Allergies/Adverse Reactions: 


                                    Allergies











Allergy/AdvReac Type Severity Reaction Status Date / Time


 


procaine Allergy Unknown unknown Verified 10/18/20 09:42











Home Medications: 


                                    Home Meds





Furosemide 60 mg PO DAILY 08/24/18 [History]


Levothyroxine [Synthroid] 50 mcg PO ACBREAKFAST 08/24/18 [History]


allopurinoL [Zyloprim] 50 mg PO DAILY 08/24/18 [History]


Warfarin Sodium [Jantoven] 7.5 mg PO SUTUTHSA 08/27/18 [History]


Acetaminophen [Pain Reliever] 650 mg PO DAILY PRN 10/19/18 [History]


Sennosides/Docusate Sodium [Senna Plus Tablet] 1 tab PO DAILY PRN 10/19/18 

[History]


Warfarin Sodium [Coumadin] 10 mg PO MOWEFR 10/19/18 [History]


Brimonidine Tartrate [Lumify] 1 drop EYEBOTH DAILY PRN 10/18/20 [History]


Magnesium Hydroxide [Milk of Magnesia] 30 ml PO TID PRN 10/18/20 [History]


Metoprolol Tartrate [Lopressor] 25 mg PO DAILY 10/18/20 [History]


Propylene Glycol//Pf [Systane 0.3-0.4% Eye Drop] 1 drop EYEBOTH BID PRN 

10/18/20 [History]


Sennosides [Senna] 8.6 mg PO BID 10/18/20 [History]


Trolamine Salicylate/Aloe Vera [Aspercreme 10%] 1 applic TOP BID 10/18/20 

[History]


bisacodyL [Bisacodyl] 10 mg RECTAL DAILY PRN 10/18/20 [History]


polyethylene glycoL 3350 [MiraLAX] 17 gm PO DAILY 10/18/20 [History]











Past Medical History


HEENT History: Reports: Impaired Vision


Other HEENT History: wears glasses


Cardiovascular History: Reports: Heart Failure, Pacemaker


Respiratory History: Reports: Asthma


Gastrointestinal History: Reports: Colon Polyp


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 

resection of bile duct. head of pancreas was removed or reconstructed. new bile 

duct is made out of intestinal tract


Genitourinary History: Reports: Other (See Below)


Other Genitourinary History: lately patient has had some incontinence issues


Musculoskeletal History: Reports: Amputation (Fingertip), Gout


Neurological History: Reports: Other (See Below)


Other Neuro History: lewie body dementia, aggressive behaviors


Psychiatric History: Reports: Dementia, Other (See Below)


Other Psychiatric History: agressive behaviors with dementia


Endocrine/Metabolic History: Reports: Hypothyroidism


Hematologic History: Reports: Anticoagulation Therapy


Other Oncologic History: bile duct 10 years ago


Dermatologic History: Reports: Other (See Below)


Other Dermatologic History: discolored lower legs bilaterally, has had this for 

a long time





- Past Surgical History


HEENT Surgical History: Reports: Other (See Below)


Other HEENT Surgeries/Procedures: teeth pulled





Social & Family History





- Family History


Family Medical History: Noncontributory





- Tobacco Use


Tobacco Use Status *Q: Unknown Ever Used Tobacco


Second Hand Smoke Exposure: No





- Caffeine Use


Caffeine Use: Reports: Coffee





- Recreational Drug Use


Recreational Drug Use: No





- Living Situation & Occupation


Living situation: Reports: , Extended Care Facility


Occupation: Retired





H&P Review of Systems





- Review of Systems:


Review Of Systems: Unable To Obtain


Reason Not Obtained: Patient not verbal or oriented to person place or time.





Exam





- Exam


Exam: See Below





- Vital Signs


Vital Signs: 


                                Last Vital Signs











Temp  37.2 C   10/18/20 09:38


 


Pulse  68   10/18/20 09:38


 


Resp  34 H  10/18/20 09:38


 


BP  126/82   10/18/20 09:38


 


Pulse Ox  93 L  10/18/20 14:36











Weight: 81.647 kg





- Exam


Quality Assessment: Other (Incontinent ).  No: Supplemental Oxygen


General: No: Alert, Oriented (Not oriented to person place or time)


HEENT: EACs Clear, EOMI, Nares Patent.  No: Conjunctiva Clear (Inflamed), Mucosa

 Moist & Pink (Dry)


Neck: Supple, Trachea Midline


Lungs: Clear to Auscultation, Normal Respiratory Effort, Other (Pacemaker left 

upper chest wall)


Cardiovascular: Regular Rate, Regular Rhythm


GI/Abdominal Exam: Normal Bowel Sounds, Soft, Non-Tender, No Distention


 (Male) Exam: Deferred


Rectal (Males) Exam: Deferred


Back Exam: Normal Inspection, Full Range of Motion


Extremities: Normal Inspection, No Pedal Edema


Skin: Warm, Dry, Intact, Other (Hemosiderin deposits lower extremities)


Neurological: No: Normal Speech


Neuro Extensive - Mental Status: No: Alert, Oriented x3


Psychiatric: Alert





- Patient Data


Lab Results Last 24 hrs: 


                         Laboratory Results - last 24 hr











  10/18/20 10/18/20 10/18/20 Range/Units





  10:00 10:00 10:00 


 


WBC  9.28 H    (4.23-9.07)  K/mm3


 


RBC  4.61 L    (4.63-6.08)  M/mm3


 


Hgb  14.2    (13.7-17.5)  gm/dl


 


Hct  43.6    (40.1-51.0)  %


 


MCV  94.6 H D    (79.0-92.2)  fl


 


MCH  30.8    (25.7-32.2)  pg


 


MCHC  32.6    (32.2-35.5)  g/dl


 


RDW Std Deviation  47.9 H    (35.1-43.9)  fL


 


Plt Count  174    (163-337)  K/mm3


 


MPV  8.7 L    (9.4-12.3)  fl


 


Neutrophils % (Manual)  77 H    (40-60)  %


 


Band Neutrophils %  2    (0-10)  %


 


Lymphocytes % (Manual)  17 L    (20-40)  %


 


Atypical Lymphs %  0    %


 


Monocytes % (Manual)  3    (2-10)  %


 


Eosinophils % (Manual)  1    (0.8-7.0)  %


 


Basophils % (Manual)  0 L    (0.2-1.2)  


 


Platelet Estimate  Adequate    


 


RBC Morph Comment  Normal    


 


PT   27.4 H   (9.7-11.7)  SECONDS


 


INR   2.61   


 


APTT   38 H   (22-31)  SECONDS


 


D-Dimer, Quantitative     (0.19-0.50)  mg/L


 


Sodium    141  (136-145)  mEq/L


 


Potassium    4.1  (3.5-5.1)  mEq/L


 


Chloride    103  ()  mEq/L


 


Carbon Dioxide    30  (21-32)  mEq/L


 


Anion Gap    12.1  (5-15)  


 


BUN    19 H  (7-18)  mg/dL


 


Creatinine    1.1  (0.7-1.3)  mg/dL


 


Est Cr Clr Drug Dosing    51.78  mL/min


 


Estimated GFR (MDRD)    > 60  (>60)  mL/min


 


BUN/Creatinine Ratio    17.3  (14-18)  


 


Glucose    104  ()  mg/dL


 


Lactic Acid     (0.4-2.0)  mmol/L


 


Calcium    8.8  (8.5-10.1)  mg/dL


 


Ferritin     ()  ng/ml


 


Total Bilirubin    2.2 H  (0.2-1.0)  mg/dL


 


AST    32  (15-37)  U/L


 


ALT    38  (16-63)  U/L


 


Alkaline Phosphatase    156 H  ()  U/L


 


Lactate Dehydrogenase    162  ()  U/L


 


Troponin I    0.021  (0.00-0.056)  ng/mL


 


C-Reactive Protein    4.8 H*  (<1.0)  mg/dL


 


Total Protein    7.3  (6.4-8.2)  g/dl


 


Albumin    3.3 L  (3.4-5.0)  g/dl


 


Globulin    4.0  gm/dL


 


Albumin/Globulin Ratio    0.8 L  (1-2)  


 


Urine Color     (Yellow)  


 


Urine Appearance     (Clear)  


 


Urine pH     (5.0-8.0)  


 


Ur Specific Gravity     (1.005-1.030)  


 


Urine Protein     (Negative)  


 


Urine Glucose (UA)     (Negative)  


 


Urine Ketones     (Negative)  


 


Urine Occult Blood     (Negative)  


 


Urine Nitrite     (Negative)  


 


Urine Bilirubin     (Negative)  


 


Urine Urobilinogen     (0.2-1.0)  


 


Ur Leukocyte Esterase     (Negative)  


 


Urine RBC     (0-5)  /hpf


 


Urine WBC     (0-5)  /hpf


 


Ur Squamous Epith Cells     (0-5)  /hpf


 


Urine Bacteria     (FEW)  /hpf


 


Urine Mucus     (FEW)  /hpf


 


SARS-CoV-2 RNA (QUINN)     (NEGATIVE)  














  10/18/20 10/18/20 10/18/20 Range/Units





  10:00 10:00 10:00 


 


WBC     (4.23-9.07)  K/mm3


 


RBC     (4.63-6.08)  M/mm3


 


Hgb     (13.7-17.5)  gm/dl


 


Hct     (40.1-51.0)  %


 


MCV     (79.0-92.2)  fl


 


MCH     (25.7-32.2)  pg


 


MCHC     (32.2-35.5)  g/dl


 


RDW Std Deviation     (35.1-43.9)  fL


 


Plt Count     (163-337)  K/mm3


 


MPV     (9.4-12.3)  fl


 


Neutrophils % (Manual)     (40-60)  %


 


Band Neutrophils %     (0-10)  %


 


Lymphocytes % (Manual)     (20-40)  %


 


Atypical Lymphs %     %


 


Monocytes % (Manual)     (2-10)  %


 


Eosinophils % (Manual)     (0.8-7.0)  %


 


Basophils % (Manual)     (0.2-1.2)  


 


Platelet Estimate     


 


RBC Morph Comment     


 


PT     (9.7-11.7)  SECONDS


 


INR     


 


APTT     (22-31)  SECONDS


 


D-Dimer, Quantitative   0.26   (0.19-0.50)  mg/L


 


Sodium     (136-145)  mEq/L


 


Potassium     (3.5-5.1)  mEq/L


 


Chloride     ()  mEq/L


 


Carbon Dioxide     (21-32)  mEq/L


 


Anion Gap     (5-15)  


 


BUN     (7-18)  mg/dL


 


Creatinine     (0.7-1.3)  mg/dL


 


Est Cr Clr Drug Dosing     mL/min


 


Estimated GFR (MDRD)     (>60)  mL/min


 


BUN/Creatinine Ratio     (14-18)  


 


Glucose     ()  mg/dL


 


Lactic Acid  0.8    (0.4-2.0)  mmol/L


 


Calcium     (8.5-10.1)  mg/dL


 


Ferritin    650 H  ()  ng/ml


 


Total Bilirubin     (0.2-1.0)  mg/dL


 


AST     (15-37)  U/L


 


ALT     (16-63)  U/L


 


Alkaline Phosphatase     ()  U/L


 


Lactate Dehydrogenase     ()  U/L


 


Troponin I     (0.00-0.056)  ng/mL


 


C-Reactive Protein     (<1.0)  mg/dL


 


Total Protein     (6.4-8.2)  g/dl


 


Albumin     (3.4-5.0)  g/dl


 


Globulin     gm/dL


 


Albumin/Globulin Ratio     (1-2)  


 


Urine Color     (Yellow)  


 


Urine Appearance     (Clear)  


 


Urine pH     (5.0-8.0)  


 


Ur Specific Gravity     (1.005-1.030)  


 


Urine Protein     (Negative)  


 


Urine Glucose (UA)     (Negative)  


 


Urine Ketones     (Negative)  


 


Urine Occult Blood     (Negative)  


 


Urine Nitrite     (Negative)  


 


Urine Bilirubin     (Negative)  


 


Urine Urobilinogen     (0.2-1.0)  


 


Ur Leukocyte Esterase     (Negative)  


 


Urine RBC     (0-5)  /hpf


 


Urine WBC     (0-5)  /hpf


 


Ur Squamous Epith Cells     (0-5)  /hpf


 


Urine Bacteria     (FEW)  /hpf


 


Urine Mucus     (FEW)  /hpf


 


SARS-CoV-2 RNA (QUINN)     (NEGATIVE)  














  10/18/20 10/18/20 Range/Units





  11:00 11:35 


 


WBC    (4.23-9.07)  K/mm3


 


RBC    (4.63-6.08)  M/mm3


 


Hgb    (13.7-17.5)  gm/dl


 


Hct    (40.1-51.0)  %


 


MCV    (79.0-92.2)  fl


 


MCH    (25.7-32.2)  pg


 


MCHC    (32.2-35.5)  g/dl


 


RDW Std Deviation    (35.1-43.9)  fL


 


Plt Count    (163-337)  K/mm3


 


MPV    (9.4-12.3)  fl


 


Neutrophils % (Manual)    (40-60)  %


 


Band Neutrophils %    (0-10)  %


 


Lymphocytes % (Manual)    (20-40)  %


 


Atypical Lymphs %    %


 


Monocytes % (Manual)    (2-10)  %


 


Eosinophils % (Manual)    (0.8-7.0)  %


 


Basophils % (Manual)    (0.2-1.2)  


 


Platelet Estimate    


 


RBC Morph Comment    


 


PT    (9.7-11.7)  SECONDS


 


INR    


 


APTT    (22-31)  SECONDS


 


D-Dimer, Quantitative    (0.19-0.50)  mg/L


 


Sodium    (136-145)  mEq/L


 


Potassium    (3.5-5.1)  mEq/L


 


Chloride    ()  mEq/L


 


Carbon Dioxide    (21-32)  mEq/L


 


Anion Gap    (5-15)  


 


BUN    (7-18)  mg/dL


 


Creatinine    (0.7-1.3)  mg/dL


 


Est Cr Clr Drug Dosing    mL/min


 


Estimated GFR (MDRD)    (>60)  mL/min


 


BUN/Creatinine Ratio    (14-18)  


 


Glucose    ()  mg/dL


 


Lactic Acid    (0.4-2.0)  mmol/L


 


Calcium    (8.5-10.1)  mg/dL


 


Ferritin    ()  ng/ml


 


Total Bilirubin    (0.2-1.0)  mg/dL


 


AST    (15-37)  U/L


 


ALT    (16-63)  U/L


 


Alkaline Phosphatase    ()  U/L


 


Lactate Dehydrogenase    ()  U/L


 


Troponin I    (0.00-0.056)  ng/mL


 


C-Reactive Protein    (<1.0)  mg/dL


 


Total Protein    (6.4-8.2)  g/dl


 


Albumin    (3.4-5.0)  g/dl


 


Globulin    gm/dL


 


Albumin/Globulin Ratio    (1-2)  


 


Urine Color  Dark yellow   (Yellow)  


 


Urine Appearance  Clear   (Clear)  


 


Urine pH  7.0   (5.0-8.0)  


 


Ur Specific Gravity  1.025   (1.005-1.030)  


 


Urine Protein  1+ H   (Negative)  


 


Urine Glucose (UA)  Negative   (Negative)  


 


Urine Ketones  Negative   (Negative)  


 


Urine Occult Blood  Negative   (Negative)  


 


Urine Nitrite  Negative   (Negative)  


 


Urine Bilirubin  Negative   (Negative)  


 


Urine Urobilinogen  2.0 H   (0.2-1.0)  


 


Ur Leukocyte Esterase  Negative   (Negative)  


 


Urine RBC  0-5   (0-5)  /hpf


 


Urine WBC  Not seen   (0-5)  /hpf


 


Ur Squamous Epith Cells  0-5   (0-5)  /hpf


 


Urine Bacteria  Rare   (FEW)  /hpf


 


Urine Mucus  Few   (FEW)  /hpf


 


SARS-CoV-2 RNA (QUINN)   Negative  (NEGATIVE)  











Result Diagrams: 


                                 10/18/20 10:00





                                 10/18/20 10:00


Jose Results Last 24 hrs: 


                                  Microbiology











 10/18/20 13:05 Influenza Type A Antigen Screen - Final





 Nasal, Unspecified    NEGATIVE INFLUENZA A VIRUS AG





    REFERENCE RANGE: NEGATIVE





 Influenza Type B Antigen Screen - Final





    Positive Influenza B Ag














Sepsis Event Note





- Evaluation


Sepsis Screening Result: Possible Sepsis Risk





- Focused Exam


Vital Signs: 


                                   Vital Signs











  Temp Pulse Resp BP Pulse Ox Pulse Ox


 


 10/18/20 14:36       93 L


 


 10/18/20 10:21       91 L


 


 10/18/20 09:38  37.2 C  68  34 H  126/82  94 L 














- Problem List


(1) Influenza B


SNOMED Code(s): 67365622


   ICD Code: J10.1 - FLU DUE TO OTH IDENT INFLUENZA VIRUS W OTH RESP MANIFEST   

Status: Acute   Priority: High   Current Visit: Yes   





(2) Pacemaker


SNOMED Code(s): 838066658


   ICD Code: Z95.0 - PRESENCE OF CARDIAC PACEMAKER   Status: Chronic   Priority:

 Medium   Current Visit: Yes   





(3) Chronic congestive heart failure


SNOMED Code(s): 39457713


   ICD Code: I50.9 - HEART FAILURE, UNSPECIFIED   Status: Chronic   Priority: 

Medium   Current Visit: Yes   


Qualifiers: 


   Heart failure type: diastolic   Qualified Code(s): I50.32 - Chronic diastolic

 (congestive) heart failure   





(4) Lewy body dementia


SNOMED Code(s): 305368849


   ICD Code: G31.83 - DEMENTIA WITH LEWY BODIES; F02.80 - DEMENTIA IN OTH 

DISEASES CLASSD ELSWHR W/O BEHAVRL DISTURB   Status: Chronic   Priority: Medium 

  Current Visit: Yes   


Qualifiers: 


   Dementia behavioral disturbance: without behavioral disturbance   Qualified 

Code(s): G31.83 - Dementia with Lewy bodies; F02.80 - Dementia in other diseases

 classified elsewhere without behavioral disturbance   


Problem List Initiated/Reviewed/Updated: Yes


Orders Last 24hrs: 


                               Active Orders 24 hr











 Category Date Time Status


 


 EKG Documentation Completion [RC] STAT Care  10/18/20 10:01 Active


 


 RT Aerosol Therapy [RC] ASDIRECTED Care  10/18/20 10:04 Active


 


 RT Aerosol Therapy [RC] ASDIRECTED Care  10/18/20 14:30 Active


 


 Chest 1V Frontal [CR] Stat Exams  10/18/20 10:04 Taken


 


 CULTURE BLOOD [BC] Stat Lab  10/18/20 10:25 Received


 


 CULTURE BLOOD [BC] Stat Lab  10/18/20 10:32 Received


 


 Blood Culture x2 Reflex Set [OM.PC] Stat Oth  10/18/20 10:01 Ordered


 


 Isolation [COMM] Routine Oth  10/18/20 13:09 Ordered











Assessment/Plan Comment:: 





The patient is an 80-year-old gentleman who has been admitted as an inpatient to

 acute hospitalization.  The patient does have laboratory proven influenza B.  I

 started the patient on Tamiflu 30 mg p.o. twice daily to see if this helps with

 his symptomology.  The patient also is dry and he will be kept on IV fluids 

consisting of normal saline at 75 mL/h.  His fluids status will be monitored 

very closely in case of fluid overload.  He is currently taking Lasix as his h

ome medication.  The patient also has severe dementia and as a result of this 

formation from the patient is scarce.  Last time the patient was in the hospital

 was in 2018.  I reviewed the patient's medications from home and I have 

restarted him on these medications.  The patient has also been taking Coumadin 

currently has a therapeutic INR and the patient will not need DVT prophylaxis.  

The patient appears to be bedridden however I am concerned that he is a fall 

risk and therefore will not have mechanical SCDs.  The patient will likely need 

to have a soft mechanical diet and I have ordered a speech eval to help 

determine if the patient does have any issues with aspiration.  Physical therapy

 and occupational therapy have also been ordered for the patient.  He should be 

appropriate for transfer back to his nursing home in 2 to 3 days.





- Mortality Measure


Prognosis:: Poor

## 2020-10-19 NOTE — PCM.PN
<KeyonnaGabi M - Last Filed: 10/19/20 13:15>





- General Info


Date of Service: 10/19/20


Admission Dx/Problem (Free Text): 


Patient is from nursing home with reduced alertness and cough.


Subjective Update: 





More alert this afternoon.  Eating and drinking very well.  Wife at the bedside.


Functional Status: Reports: Pain Controlled, Tolerating Diet, Urinating 

(Incontinent).  Denies: Ambulating





- Review of Systems


General: Reports: Weakness, Fatigue


HEENT: Reports: No Symptoms


Pulmonary: Reports: Cough.  Denies: Sputum


Cardiovascular: Reports: No Symptoms


Gastrointestinal: Reports: No Symptoms


Genitourinary: Reports: Incontinence


Musculoskeletal: Reports: No Symptoms


Skin: Reports: No Symptoms


Neurological: Reports: Other (Patient is nonverbal due to Lewy body dementia)


Psychiatric: Reports: Other (Lewy body dementia)





- Patient Data


Vitals - Most Recent: 


                                Last Vital Signs











Temp  98.1 F   10/19/20 11:56


 


Pulse  58 L  10/19/20 11:56


 


Resp  22 H  10/19/20 11:56


 


BP  108/57 L  10/19/20 11:56


 


Pulse Ox  98   10/19/20 11:56











Weight - Most Recent: 76.113 kg


I&O - Last 24 Hours: 


                                 Intake & Output











 10/18/20 10/19/20 10/19/20





 22:59 06:59 14:59


 


Intake Total 800 1050 240


 


Balance 800 1050 240











Lab Results Last 24 Hours: 


                         Laboratory Results - last 24 hr











  10/18/20 10/19/20 10/19/20 Range/Units





  16:10 06:00 06:00 


 


WBC   7.15   (4.23-9.07)  K/mm3


 


RBC   4.18 L   (4.63-6.08)  M/mm3


 


Hgb   12.8 L   (13.7-17.5)  gm/dl


 


Hct   40.1   (40.1-51.0)  %


 


MCV   95.9 H   (79.0-92.2)  fl


 


MCH   30.6   (25.7-32.2)  pg


 


MCHC   31.9 L   (32.2-35.5)  g/dl


 


RDW Std Deviation   49.7 H   (35.1-43.9)  fL


 


Plt Count   160 L   (163-337)  K/mm3


 


MPV   9.1 L   (9.4-12.3)  fl


 


Neut % (Auto)   71.4 H   (34.0-67.9)  %


 


Lymph % (Auto)   15.1 L   (21.8-53.1)  %


 


Mono % (Auto)   10.6   (5.3-12.2)  %


 


Eos % (Auto)   2.7   (0.8-7.0)  


 


Baso % (Auto)   0.1   (0.1-1.2)  %


 


Neut # (Auto)   5.10   (1.78-5.38)  K/mm3


 


Lymph # (Auto)   1.08 L   (1.32-3.57)  K/mm3


 


Mono # (Auto)   0.76   (0.30-0.82)  K/mm3


 


Eos # (Auto)   0.19   (0.04-0.54)  K/mm3


 


Baso # (Auto)   0.01   (0.01-0.08)  K/mm3


 


PT    25.6 H  (9.7-11.7)  SECONDS


 


INR    2.43  


 


Phosphorus     (2.6-4.7)  mg/dL


 


Magnesium     (1.8-2.4)  mg/dl


 


MRSA (PCR)  Negative    














  10/19/20 Range/Units





  06:00 


 


WBC   (4.23-9.07)  K/mm3


 


RBC   (4.63-6.08)  M/mm3


 


Hgb   (13.7-17.5)  gm/dl


 


Hct   (40.1-51.0)  %


 


MCV   (79.0-92.2)  fl


 


MCH   (25.7-32.2)  pg


 


MCHC   (32.2-35.5)  g/dl


 


RDW Std Deviation   (35.1-43.9)  fL


 


Plt Count   (163-337)  K/mm3


 


MPV   (9.4-12.3)  fl


 


Neut % (Auto)   (34.0-67.9)  %


 


Lymph % (Auto)   (21.8-53.1)  %


 


Mono % (Auto)   (5.3-12.2)  %


 


Eos % (Auto)   (0.8-7.0)  


 


Baso % (Auto)   (0.1-1.2)  %


 


Neut # (Auto)   (1.78-5.38)  K/mm3


 


Lymph # (Auto)   (1.32-3.57)  K/mm3


 


Mono # (Auto)   (0.30-0.82)  K/mm3


 


Eos # (Auto)   (0.04-0.54)  K/mm3


 


Baso # (Auto)   (0.01-0.08)  K/mm3


 


PT   (9.7-11.7)  SECONDS


 


INR   


 


Phosphorus  2.9  (2.6-4.7)  mg/dL


 


Magnesium  1.7 L  (1.8-2.4)  mg/dl


 


MRSA (PCR)   











Jose Results Last 24 Hours: 


                                  Microbiology











 10/18/20 10:32 Aerobic Blood Culture - Preliminary





 Blood - Venous - Lab Draw    NO GROWTH AFTER 1 DAY





 Anaerobic Blood Culture - Preliminary





    NO GROWTH AFTER 1 DAY


 


 10/18/20 10:25 Aerobic Blood Culture - Preliminary





 Blood - Venous    NO GROWTH AFTER 1 DAY





 Anaerobic Blood Culture - Preliminary





    NO GROWTH AFTER 1 DAY


 


 10/18/20 13:05 Influenza Type A Antigen Screen - Final





 Nasal, Unspecified    NEGATIVE INFLUENZA A VIRUS AG





    REFERENCE RANGE: NEGATIVE





 Influenza Type B Antigen Screen - Final





    Positive Influenza B Ag











Med Orders - Current: 


                               Current Medications





Acetaminophen (Tylenol)  650 mg RECTAL Q4H PRN


   PRN Reason: Pain (mild 1-3)


Albuterol/Ipratropium (Duoneb 3.0-0.5 Mg/3 Ml)  3 ml NEB Q4H PRN


   PRN Reason: Shortness Of Breath/wheezing


   Last Admin: 10/19/20 09:12 Dose:  3 ml


   Documented by: 


Allopurinol (Zyloprim)  50 mg PO DAILY [facility]


   Last Admin: 10/19/20 08:30 Dose:  50 mg


   Documented by: 


Artificial Tears (Refresh Liquigel 1%)  0 ml EYEBOTH BID PRN


   PRN Reason: Dry Eyes


Bisacodyl (Dulcolax)  10 mg RECTAL DAILY PRN


   PRN Reason: Constipation


Furosemide (Lasix)  60 mg PO DAILY [facility]


   Last Admin: 10/19/20 08:31 Dose:  60 mg


   Documented by: 


Sodium Chloride (Normal Saline)  1,000 mls @ 75 mls/hr IV ASDIRECTED [facility]


   Last Admin: 10/19/20 05:32 Dose:  75 mls/hr


   Documented by: 


Ketorolac Tromethamine (Toradol)  30 mg IM Q6H PRN


   PRN Reason: Pain (moderate 4-6)


Levothyroxine Sodium (Synthroid)  50 mcg PO ACBREAKFAST [facility]


   Last Admin: 10/19/20 05:33 Dose:  50 mcg


   Documented by: 


Magnesium Hydroxide (Milk Of Magnesia)  30 ml PO TID PRN


   PRN Reason: Constipation


Metoprolol Tartrate (Lopressor)  25 mg PO DAILY [facility]


   Last Admin: 10/19/20 08:30 Dose:  Not Given


   Documented by: 


Ondansetron HCl (Zofran)  4 mg IV Q6H PRN


   PRN Reason: Nausea/Vomiting


Oseltamivir Phosphate (Tamiflu)  30 mg PO BID [facility]


   Last Admin: 10/19/20 08:33 Dose:  30 mg


   Documented by: 


Trolamine Salicylate (Aspercreme 10%)  0 gm TOP BID [facility]


   Last Admin: 10/19/20 09:22 Dose:  Not Given


   Documented by: 


Warfarin Sodium (Coumadin)  10 mg PO MoWeFr@1800 [facility]


Warfarin Sodium (Coumadin)  7.5 mg PO SuTuThSa@1800 [facility]


   Last Admin: 10/18/20 18:17 Dose:  7.5 mg


   Documented by: 





Discontinued Medications





Albuterol/Ipratropium (Duoneb 3.0-0.5 Mg/3 Ml)  3 ml NEB ONETIME ONE


   Stop: 10/18/20 10:05


   Last Admin: 10/18/20 10:20 Dose:  3 ml


   Documented by: 


Albuterol/Ipratropium (Duoneb 3.0-0.5 Mg/3 Ml)  3 ml NEB ONETIME ONE


   Stop: 10/18/20 14:29


   Last Admin: 10/18/20 14:36 Dose:  3 ml


   Documented by: 


Magnesium Sulfate (Magnesium Sulfate In Water Premix)  2 gm in 50 mls @ 25 

mls/hr IV ONETIME ONE


   Stop: 10/19/20 10:03


   Last Admin: 10/19/20 09:51 Dose:  25 mls/hr


   Documented by: 


Lorazepam (Ativan)  0.5 mg IVPUSH ONETIME ONE


   Stop: 10/18/20 10:25


   Last Admin: 10/18/20 10:48 Dose:  0.5 mg


   Documented by: 


Morphine Sulfate (Morphine)  1 mg IVPUSH ONETIME ONE


   Stop: 10/18/20 10:25


   Last Admin: 10/18/20 10:48 Dose:  1 mg


   Documented by: 


Non-Formulary Medication (Brimonidine Tartrate [Lumify])  1 drop EYEBOTH DAILY 

PRN


   PRN Reason: redness


Oseltamivir Phosphate (Tamiflu)  30 mg PO ONETIME ONE


   Stop: 10/18/20 14:14


   Last Admin: 10/18/20 14:50 Dose:  30 mg


   Documented by: 











- Exam


Quality Assessment: Supplemental Oxygen, DVT Prophylaxis (On Coumadin)


General: Alert, Mild Distress, Other (Patient is nonverbal)


HEENT: Pupils Equal, Pupils Reactive, Mucous Membr. Moist/Pink


Neck: Supple, Trachea Midline.  No: Lymphadenopathy


Lungs: Decreased Breath Sounds, Rales, Rhonchi (Left posterior)


Cardiovascular: Regular Rate


GI/Abdominal Exam: Normal Bowel Sounds, Soft, Non-Tender, No Distention


 (Male) Exam: Deferred


Back Exam: Normal Inspection, Full Range of Motion


Extremities: Normal Inspection, Non-Tender, No Pedal Edema, Normal Capillary 

Refill, Other (Contractures noted to hands)


Peripheral Pulses: 2+: Radial (L), Radial (R), Dorsalis Pedis (L), Dorsalis 

Pedis (R)


Skin: Warm, Dry, Intact


Neurological: No New Focal Deficit


Psy/Mental Status: Alert, Normal Affect, Normal Mood





Sepsis Event Note





- Evaluation


Sepsis Screening Result: No Definite Risk





- Focused Exam


Vital Signs: 


                                   Vital Signs











  Temp Pulse Pulse Resp BP Pulse Ox Pulse Ox


 


 10/19/20 11:56  98.1 F  58 L   22 H  108/57 L  98 


 


 10/19/20 09:14        94 L


 


 10/19/20 08:30   59 L    99/47 L  


 


 10/19/20 08:23  98.2 F  61   24 H  99/47 L  100 


 


 10/19/20 04:04  97.5 F    24 H  107/59 L  


 


 10/19/20 04:00    60    92 L 














- Problem List & Annotations


(1) Influenza B


SNOMED Code(s): 45977891


   Code(s): J10.1 - FLU DUE TO OTH IDENT INFLUENZA VIRUS W OTH RESP MANIFEST   

Status: Acute   Priority: High   Current Visit: Yes   





(2) Chronic congestive heart failure


SNOMED Code(s): 02674533


   Code(s): I50.9 - HEART FAILURE, UNSPECIFIED   Status: Chronic   Priority: 

Medium   Current Visit: Yes   


Qualifiers: 


   Heart failure type: diastolic   Qualified Code(s): I50.32 - Chronic diastolic

 (congestive) heart failure   





(3) Lewy body dementia


SNOMED Code(s): 831398016


   Code(s): G31.83 - DEMENTIA WITH LEWY BODIES; F02.80 - DEMENTIA IN OTH D

ISEASES CLASSD ELSWHR W/O BEHAVRL DISTURB   Status: Chronic   Priority: Medium  

 Current Visit: Yes   


Qualifiers: 


   Dementia behavioral disturbance: without behavioral disturbance   Qualified 

Code(s): G31.83 - Dementia with Lewy bodies; F02.80 - Dementia in other diseases

 classified elsewhere without behavioral disturbance   





(4) Pacemaker


SNOMED Code(s): 983324671


   Code(s): Z95.0 - PRESENCE OF CARDIAC PACEMAKER   Status: Chronic   Priority: 

Medium   Current Visit: Yes   





- Problem List Review


Problem List Initiated/Reviewed/Updated: Yes





- Assessment


Assessment:: 





10/19/20


* positive for influenza B. Currently receiving tamiflu for this


* O2 titrated down


* more alert this afternoon, eating and drinking well per nursing staff


* Mg 1.7 today


* VSS  no fever


PLAN:


* Continue Tamiflu


* Continue IV fluids


* Exam 2 g IV today


* Continue nebulizer treatments


* Labs tomorrow


Tentative plan for patient to return to [facility] tomorrow





- Plan


Plan:: 





The patient is an 80-year-old gentleman who has been admitted as an inpatient to

 acute hospitalization.  The patient does have laboratory proven influenza B.  I

 started the patient on Tamiflu 30 mg p.o. twice daily to see if this helps with

 his symptomology.  The patient also is dry and he will be kept on IV fluids 

consisting of normal saline at 75 mL/h.  His fluids status will be monitored 

very closely in case of fluid overload.  He is currently taking Lasix as his 

home medication.  The patient also has severe dementia and as a result of this 

formation from the patient is scarce.  Last time the patient was in the hospital

 was in 2018.  I reviewed the patient's medications from home and I have 

restarted him on these medications.  The patient has also been taking Coumadin 

currently has a therapeutic INR and the patient will not need DVT prophylaxis.  

The patient appears to be bedridden however I am concerned that he is a fall 

risk and therefore will not have mechanical SCDs.  The patient will likely need 

to have a soft mechanical diet and I have ordered a speech eval to help 

determine if the patient does have any issues with aspiration.  Physical therapy

 and occupational therapy have also been ordered for the patient.  He should be 

appropriate for transfer back to his nursing home in 2 to 3 days.





Influenza B


-albuterol nebulizer


-tamiflu


-NS IV fluids for rehydration





Pacemaker; Chronic congestive heart failure


-on lasix, metoprolol and coumadin





Lewy body dementia


-chronic progression





PROPHYLAXIS:


DVT: Coumadin





CODE STATUS: DNR\DNI





DISPOSITION:


Patient will remain admitted for rehydration, nebulizer treatment, and oxygen 

supplementation.  Plan to discharge back to [facility] tomorrow.  











<Lazaro Hartley - Last Filed: 10/19/20 17:21>





- Patient Data


Vitals - Most Recent: 


                                Last Vital Signs











Temp  37.1 C   10/19/20 16:49


 


Pulse  60   10/19/20 16:49


 


Resp  30 H  10/19/20 16:49


 


BP  113/60   10/19/20 16:49


 


Pulse Ox  96   10/19/20 17:14











I&O - Last 24 Hours: 


                                 Intake & Output











 10/19/20 10/19/20 10/19/20





 06:59 14:59 22:59


 


Intake Total 1050 240 0


 


Balance 1050 240 0











Lab Results Last 24 Hours: 


                         Laboratory Results - last 24 hr











  10/18/20 10/19/20 10/19/20 Range/Units





  16:10 06:00 06:00 


 


WBC   7.15   (4.23-9.07)  K/mm3


 


RBC   4.18 L   (4.63-6.08)  M/mm3


 


Hgb   12.8 L   (13.7-17.5)  gm/dl


 


Hct   40.1   (40.1-51.0)  %


 


MCV   95.9 H   (79.0-92.2)  fl


 


MCH   30.6   (25.7-32.2)  pg


 


MCHC   31.9 L   (32.2-35.5)  g/dl


 


RDW Std Deviation   49.7 H   (35.1-43.9)  fL


 


Plt Count   160 L   (163-337)  K/mm3


 


MPV   9.1 L   (9.4-12.3)  fl


 


Neut % (Auto)   71.4 H   (34.0-67.9)  %


 


Lymph % (Auto)   15.1 L   (21.8-53.1)  %


 


Mono % (Auto)   10.6   (5.3-12.2)  %


 


Eos % (Auto)   2.7   (0.8-7.0)  


 


Baso % (Auto)   0.1   (0.1-1.2)  %


 


Neut # (Auto)   5.10   (1.78-5.38)  K/mm3


 


Lymph # (Auto)   1.08 L   (1.32-3.57)  K/mm3


 


Mono # (Auto)   0.76   (0.30-0.82)  K/mm3


 


Eos # (Auto)   0.19   (0.04-0.54)  K/mm3


 


Baso # (Auto)   0.01   (0.01-0.08)  K/mm3


 


PT    25.6 H  (9.7-11.7)  SECONDS


 


INR    2.43  


 


Phosphorus     (2.6-4.7)  mg/dL


 


Magnesium     (1.8-2.4)  mg/dl


 


MRSA (PCR)  Negative    














  10/19/20 Range/Units





  06:00 


 


WBC   (4.23-9.07)  K/mm3


 


RBC   (4.63-6.08)  M/mm3


 


Hgb   (13.7-17.5)  gm/dl


 


Hct   (40.1-51.0)  %


 


MCV   (79.0-92.2)  fl


 


MCH   (25.7-32.2)  pg


 


MCHC   (32.2-35.5)  g/dl


 


RDW Std Deviation   (35.1-43.9)  fL


 


Plt Count   (163-337)  K/mm3


 


MPV   (9.4-12.3)  fl


 


Neut % (Auto)   (34.0-67.9)  %


 


Lymph % (Auto)   (21.8-53.1)  %


 


Mono % (Auto)   (5.3-12.2)  %


 


Eos % (Auto)   (0.8-7.0)  


 


Baso % (Auto)   (0.1-1.2)  %


 


Neut # (Auto)   (1.78-5.38)  K/mm3


 


Lymph # (Auto)   (1.32-3.57)  K/mm3


 


Mono # (Auto)   (0.30-0.82)  K/mm3


 


Eos # (Auto)   (0.04-0.54)  K/mm3


 


Baso # (Auto)   (0.01-0.08)  K/mm3


 


PT   (9.7-11.7)  SECONDS


 


INR   


 


Phosphorus  2.9  (2.6-4.7)  mg/dL


 


Magnesium  1.7 L  (1.8-2.4)  mg/dl


 


MRSA (PCR)   











Jose Results Last 24 Hours: 


                                  Microbiology











 10/18/20 10:32 Aerobic Blood Culture - Preliminary





 Blood - Venous - Lab Draw    NO GROWTH AFTER 1 DAY





 Anaerobic Blood Culture - Preliminary





    NO GROWTH AFTER 1 DAY


 


 10/18/20 10:25 Aerobic Blood Culture - Preliminary





 Blood - Venous    NO GROWTH AFTER 1 DAY





 Anaerobic Blood Culture - Preliminary





    NO GROWTH AFTER 1 DAY


 


 10/18/20 13:05 Influenza Type A Antigen Screen - Final





 Nasal, Unspecified    NEGATIVE INFLUENZA A VIRUS AG





    REFERENCE RANGE: NEGATIVE





 Influenza Type B Antigen Screen - Final





    Positive Influenza B Ag











Med Orders - Current: 


                               Current Medications





Acetaminophen (Tylenol)  650 mg RECTAL Q4H PRN


   PRN Reason: Pain (mild 1-3)


Albuterol/Ipratropium (Duoneb 3.0-0.5 Mg/3 Ml)  3 ml NEB Q4H PRN


   PRN Reason: Shortness Of Breath/wheezing


   Last Admin: 10/19/20 17:12 Dose:  3 ml


   Documented by: 


Allopurinol (Zyloprim)  50 mg PO DAILY [facility]


   Last Admin: 10/19/20 08:30 Dose:  50 mg


   Documented by: 


Artificial Tears (Refresh Liquigel 1%)  0 ml EYEBOTH BID PRN


   PRN Reason: Dry Eyes


Bisacodyl (Dulcolax)  10 mg RECTAL DAILY PRN


   PRN Reason: Constipation


Furosemide (Lasix)  60 mg PO DAILY [facility]


   Last Admin: 10/19/20 08:31 Dose:  60 mg


   Documented by: 


Sodium Chloride (Normal Saline)  1,000 mls @ 75 mls/hr IV ASDIRECTED [facility]


   Last Admin: 10/19/20 05:32 Dose:  75 mls/hr


   Documented by: 


Ketorolac Tromethamine (Toradol)  30 mg IM Q6H PRN


   PRN Reason: Pain (moderate 4-6)


Levothyroxine Sodium (Synthroid)  50 mcg PO ACBREAKFAST [facility]


   Last Admin: 10/19/20 05:33 Dose:  50 mcg


   Documented by: 


Magnesium Hydroxide (Milk Of Magnesia)  30 ml PO TID PRN


   PRN Reason: Constipation


Metoprolol Tartrate (Lopressor)  25 mg PO DAILY [facility]


   Last Admin: 10/19/20 08:30 Dose:  Not Given


   Documented by: 


Ondansetron HCl (Zofran)  4 mg IV Q6H PRN


   PRN Reason: Nausea/Vomiting


Oseltamivir Phosphate (Tamiflu)  30 mg PO BID [facility]


   Last Admin: 10/19/20 08:33 Dose:  30 mg


   Documented by: 


Trolamine Salicylate (Aspercreme 10%)  0 gm TOP BID [facility]


   Last Admin: 10/19/20 09:22 Dose:  Not Given


   Documented by: 


Warfarin Sodium (Coumadin)  10 mg PO MoWeFr@1800 [facility]


Warfarin Sodium (Coumadin)  7.5 mg PO SuTuThSa@1800 [facility]


   Last Admin: 10/18/20 18:17 Dose:  7.5 mg


   Documented by: 





Discontinued Medications





Albuterol/Ipratropium (Duoneb 3.0-0.5 Mg/3 Ml)  3 ml NEB ONETIME ONE


   Stop: 10/18/20 10:05


   Last Admin: 10/18/20 10:20 Dose:  3 ml


   Documented by: 


Albuterol/Ipratropium (Duoneb 3.0-0.5 Mg/3 Ml)  3 ml NEB ONETIME ONE


   Stop: 10/18/20 14:29


   Last Admin: 10/18/20 14:36 Dose:  3 ml


   Documented by: 


Magnesium Sulfate (Magnesium Sulfate In Water Premix)  2 gm in 50 mls @ 25 

mls/hr IV ONETIME ONE


   Stop: 10/19/20 10:03


   Last Admin: 10/19/20 09:51 Dose:  25 mls/hr


   Documented by: 


Lorazepam (Ativan)  0.5 mg IVPUSH ONETIME ONE


   Stop: 10/18/20 10:25


   Last Admin: 10/18/20 10:48 Dose:  0.5 mg


   Documented by: 


Morphine Sulfate (Morphine)  1 mg IVPUSH ONETIME ONE


   Stop: 10/18/20 10:25


   Last Admin: 10/18/20 10:48 Dose:  1 mg


   Documented by: 


Non-Formulary Medication (Brimonidine Tartrate [Lumify])  1 drop EYEBOTH DAILY 

PRN


   PRN Reason: redness


Oseltamivir Phosphate (Tamiflu)  30 mg PO ONETIME ONE


   Stop: 10/18/20 14:14


   Last Admin: 10/18/20 14:50 Dose:  30 mg


   Documented by: 











Sepsis Event Note





- Focused Exam


Vital Signs: 


                                   Vital Signs











  Temp Temp Pulse Pulse Resp BP BP


 


 10/19/20 17:14       


 


 10/19/20 16:49  37.1 C   60   30 H  113/60 


 


 10/19/20 16:00   37.1 C   62  30 H   113/60


 


 10/19/20 14:49       


 


 10/19/20 13:00       


 


 10/19/20 11:56  36.7 C   58 L   22 H  108/57 L 


 


 10/19/20 11:00       


 


 10/19/20 09:14       


 


 10/19/20 08:30    59 L    99/47 L 


 


 10/19/20 08:23  36.8 C   61   24 H  99/47 L 














  Pulse Ox Pulse Ox Pulse Ox


 


 10/19/20 17:14    96


 


 10/19/20 16:49   


 


 10/19/20 16:00  97  


 


 10/19/20 14:49   100 


 


 10/19/20 13:00   100 


 


 10/19/20 11:56  98  


 


 10/19/20 11:00   98 


 


 10/19/20 09:14   94 L 


 


 10/19/20 08:30   


 


 10/19/20 08:23  100  














- Problem List & Annotations


(1) Influenza B


SNOMED Code(s): 20683342


   Code(s): J10.1 - FLU DUE TO OTH IDENT INFLUENZA VIRUS W OTH RESP MANIFEST   

Status: Acute   Priority: High   Current Visit: Yes   





(2) Pacemaker


SNOMED Code(s): 500648974


   Code(s): Z95.0 - PRESENCE OF CARDIAC PACEMAKER   Status: Chronic   Priority: 

Medium   Current Visit: Yes   





(3) Chronic congestive heart failure


SNOMED Code(s): 68065975


   Code(s): I50.9 - HEART FAILURE, UNSPECIFIED   Status: Chronic   Priority: 

Medium   Current Visit: Yes   


Qualifiers: 


   Heart failure type: diastolic   Qualified Code(s): I50.32 - Chronic diastolic

 (congestive) heart failure   





(4) Lewy body dementia


SNOMED Code(s): 282424938


   Code(s): G31.83 - DEMENTIA WITH LEWY BODIES; F02.80 - DEMENTIA IN OTH 

DISEASES CLASSD ELSWHR W/O BEHAVRL DISTURB   Status: Chronic   Priority: Medium 

  Current Visit: Yes   


Qualifiers: 


   Dementia behavioral disturbance: without behavioral disturbance   Qualified 

Code(s): G31.83 - Dementia with Lewy bodies; F02.80 - Dementia in other diseases

 classified elsewhere without behavioral disturbance   





- My Orders


Last 24 Hours: 


My Active Orders





10/18/20 16:57


Patient Status [ADT] Routine 





10/18/20 18:00


Warfarin [Coumadin]   7.5 mg PO SuTuThSa@1800 





10/18/20 18:11


Pulse Oximetry Continuous Monitoring [OM.PC] Routine 





10/18/20 21:00


Oseltamivir [Tamiflu]   30 mg PO BID 


Trolamine Salicylate/Aloe Vera [Aspercreme 10%]   0 gm TOP BID 





10/19/20 06:00


Levothyroxine [Synthroid]   50 mcg PO ACBREAKFAST 





10/19/20 09:00


Furosemide [Lasix]   60 mg PO DAILY 


Metoprolol Tartrate [Lopressor]   25 mg PO DAILY 


allopurinoL [Zyloprim]   50 mg PO DAILY 





10/19/20 Dinner


Mechanical Soft Diet [DIET] 





10/19/20 18:00


Warfarin [Coumadin]   10 mg PO MoWeFr@1800 














- Assessment


Assessment:: 





I have seen and examined the patient independent of nurse practitioner Gabi Newby and I have discussed the case with her.  I have reviewed and agree with

 the assessment and plan as outlined for this patient by her.  Please see 

orders.

## 2020-10-20 NOTE — PCM.PN
<KeyonnaGabi M - Last Filed: 10/20/20 13:19>





- General Info


Date of Service: 10/20/20


Admission Dx/Problem (Free Text): 


Patient is from nursing home with reduced alertness and cough.


Subjective Update: 





Much more alert today.  Per the nursing staff he is eating and drinking very 

well.  Voiding large amounts.  Will discontinue IV fluids.


Functional Status: Reports: Pain Controlled, Tolerating Diet, Urinating





- Review of Systems


General: Reports: No Symptoms


HEENT: Reports: No Symptoms


Pulmonary: Reports: Shortness of Breath (On oxygen at 2 L per nasal cannula)


Cardiovascular: Reports: No Symptoms.  Denies: Edema


Gastrointestinal: Reports: No Symptoms


Genitourinary: Reports: Incontinence


Musculoskeletal: Reports: No Symptoms


Skin: Reports: No Symptoms


Neurological: Reports: Other (Patient is nonverbal due to Lewy body dementia)


Psychiatric: Reports: No Symptoms





- Patient Data


Vitals - Most Recent: 


                                Last Vital Signs











Temp  97.2 F   10/20/20 12:11


 


Pulse  61   10/20/20 12:04


 


Resp  34 H  10/20/20 12:04


 


BP  134/91 H  10/20/20 12:04


 


Pulse Ox  100   10/20/20 12:47











Weight - Most Recent: 77.156 kg


I&O - Last 24 Hours: 


                                 Intake & Output











 10/19/20 10/20/20 10/20/20





 22:59 06:59 14:59


 


Intake Total 1260 1600 420


 


Balance 1260 1600 420











Lab Results Last 24 Hours: 


                         Laboratory Results - last 24 hr











  10/20/20 10/20/20 Range/Units





  08:53 08:53 


 


WBC  6.22   (4.23-9.07)  K/mm3


 


RBC  4.01 L   (4.63-6.08)  M/mm3


 


Hgb  12.3 L   (13.7-17.5)  gm/dl


 


Hct  38.2 L   (40.1-51.0)  %


 


MCV  95.3 H   (79.0-92.2)  fl


 


MCH  30.7   (25.7-32.2)  pg


 


MCHC  32.2   (32.2-35.5)  g/dl


 


RDW Std Deviation  49.5 H   (35.1-43.9)  fL


 


Plt Count  157 L   (163-337)  K/mm3


 


MPV  8.8 L   (9.4-12.3)  fl


 


Neut % (Auto)  71.1 H   (34.0-67.9)  %


 


Lymph % (Auto)  13.7 L   (21.8-53.1)  %


 


Mono % (Auto)  12.4 H   (5.3-12.2)  %


 


Eos % (Auto)  2.4   (0.8-7.0)  


 


Baso % (Auto)  0.2   (0.1-1.2)  %


 


Neut # (Auto)  4.43   (1.78-5.38)  K/mm3


 


Lymph # (Auto)  0.85 L   (1.32-3.57)  K/mm3


 


Mono # (Auto)  0.77   (0.30-0.82)  K/mm3


 


Eos # (Auto)  0.15   (0.04-0.54)  K/mm3


 


Baso # (Auto)  0.01   (0.01-0.08)  K/mm3


 


Sodium   142  (136-145)  mEq/L


 


Potassium   3.6  (3.5-5.1)  mEq/L


 


Chloride   106  ()  mEq/L


 


Carbon Dioxide   30  (21-32)  mEq/L


 


Anion Gap   9.6  (5-15)  


 


BUN   16  (7-18)  mg/dL


 


Creatinine   0.9  (0.7-1.3)  mg/dL


 


Est Cr Clr Drug Dosing   61.22  mL/min


 


Estimated GFR (MDRD)   > 60  (>60)  mL/min


 


BUN/Creatinine Ratio   17.8  (14-18)  


 


Glucose   91  ()  mg/dL


 


Calcium   8.5  (8.5-10.1)  mg/dL


 


Magnesium   1.9  (1.8-2.4)  mg/dl


 


Total Bilirubin   1.5 H  (0.2-1.0)  mg/dL


 


AST   32  (15-37)  U/L


 


ALT   33  (16-63)  U/L


 


Alkaline Phosphatase   142 H  ()  U/L


 


Total Protein   6.2 L  (6.4-8.2)  g/dl


 


Albumin   2.6 L  (3.4-5.0)  g/dl


 


Globulin   3.6  gm/dL


 


Albumin/Globulin Ratio   0.7 L  (1-2)  











Jose Results Last 24 Hours: 


                                  Microbiology











 10/18/20 10:32 Aerobic Blood Culture - Preliminary





 Blood - Venous - Lab Draw    NO GROWTH AFTER 2 DAYS





 Anaerobic Blood Culture - Preliminary





    NO GROWTH AFTER 2 DAYS


 


 10/18/20 10:25 Aerobic Blood Culture - Preliminary





 Blood - Venous    NO GROWTH AFTER 2 DAYS





 Anaerobic Blood Culture - Preliminary





    NO GROWTH AFTER 2 DAYS











Med Orders - Current: 


                               Current Medications





Acetaminophen (Tylenol)  650 mg RECTAL Q4H PRN


   PRN Reason: Pain (mild 1-3)


   Last Admin: 10/19/20 20:38 Dose:  650 mg


   Documented by: 


Albuterol/Ipratropium (Duoneb 3.0-0.5 Mg/3 Ml)  3 ml NEB Q4H PRN


   PRN Reason: Shortness Of Breath/wheezing


   Last Admin: 10/20/20 12:47 Dose:  3 ml


   Documented by: 


Allopurinol (Zyloprim)  50 mg PO DAILY [facility]


   Last Admin: 10/20/20 09:01 Dose:  50 mg


   Documented by: 


Artificial Tears (Refresh Liquigel 1%)  0 ml EYEBOTH BID PRN


   PRN Reason: Dry Eyes


Bisacodyl (Dulcolax)  10 mg RECTAL DAILY PRN


   PRN Reason: Constipation


Furosemide (Lasix)  60 mg PO DAILY [facility]


   Last Admin: 10/20/20 09:00 Dose:  60 mg


   Documented by: 


Ketorolac Tromethamine (Toradol)  30 mg IM Q6H PRN


   PRN Reason: Pain (moderate 4-6)


Levothyroxine Sodium (Synthroid)  50 mcg PO ACBREAKFAST [facility]


   Last Admin: 10/20/20 09:01 Dose:  50 mcg


   Documented by: 


Magnesium Hydroxide (Milk Of Magnesia)  30 ml PO TID PRN


   PRN Reason: Constipation


Metoprolol Tartrate (Lopressor)  25 mg PO DAILY [facility]


   Last Admin: 10/20/20 09:16 Dose:  Not Given


   Documented by: 


Ondansetron HCl (Zofran)  4 mg IV Q6H PRN


   PRN Reason: Nausea/Vomiting


Oseltamivir Phosphate (Tamiflu)  30 mg PO BID [facility]


   Last Admin: 10/20/20 09:19 Dose:  30 mg


   Documented by: 


Trolamine Salicylate (Aspercreme 10%)  0 gm TOP BID [facility]


   Last Admin: 10/20/20 09:02 Dose:  1 applic


   Documented by: 


Warfarin Sodium (Coumadin)  7.5 mg PO SuTuThSa@1800 [facility]


   Last Admin: 10/18/20 18:17 Dose:  7.5 mg


   Documented by: 


Warfarin Sodium (Coumadin)  10 mg PO MoWeFr@1800 [facility]


   Last Admin: 10/19/20 18:51 Dose:  10 mg


   Documented by: 





Discontinued Medications





Albuterol/Ipratropium (Duoneb 3.0-0.5 Mg/3 Ml)  3 ml NEB ONETIME ONE


   Stop: 10/18/20 10:05


   Last Admin: 10/18/20 10:20 Dose:  3 ml


   Documented by: 


Albuterol/Ipratropium (Duoneb 3.0-0.5 Mg/3 Ml)  3 ml NEB ONETIME ONE


   Stop: 10/18/20 14:29


   Last Admin: 10/18/20 14:36 Dose:  3 ml


   Documented by: 


Furosemide (Lasix)  20 mg IVPUSH NOW ONE


   Stop: 10/20/20 13:01


Sodium Chloride (Normal Saline)  1,000 mls @ 75 mls/hr IV ASDIRECTED [facility]


   Last Admin: 10/20/20 06:36 Dose:  75 mls/hr


   Documented by: 


Magnesium Sulfate (Magnesium Sulfate In Water Premix)  2 gm in 50 mls @ 25 

mls/hr IV ONETIME ONE


   Stop: 10/19/20 10:03


   Last Admin: 10/19/20 09:51 Dose:  25 mls/hr


   Documented by: 


Lorazepam (Ativan)  0.5 mg IVPUSH ONETIME ONE


   Stop: 10/18/20 10:25


   Last Admin: 10/18/20 10:48 Dose:  0.5 mg


   Documented by: 


Morphine Sulfate (Morphine)  1 mg IVPUSH ONETIME ONE


   Stop: 10/18/20 10:25


   Last Admin: 10/18/20 10:48 Dose:  1 mg


   Documented by: 


Non-Formulary Medication (Brimonidine Tartrate [Lumify])  1 drop EYEBOTH DAILY 

PRN


   PRN Reason: redness


Oseltamivir Phosphate (Tamiflu)  30 mg PO ONETIME ONE


   Stop: 10/18/20 14:14


   Last Admin: 10/18/20 14:50 Dose:  30 mg


   Documented by: 


Oseltamivir Phosphate (Tamiflu)  30 mg PO BID [facility]


   Last Admin: 10/20/20 09:16 Dose:  Not Given


   Documented by: 


Warfarin Sodium (Coumadin)  10 mg PO MoWeFr@1800 [facility]


   Last Admin: 10/19/20 19:37 Dose:  Not Given


   Documented by: 











- Exam


Quality Assessment: Supplemental Oxygen (2 L per nasal cannula), DVT Prophylaxis

 (Lovenox)


General: Alert, Cooperative, No Acute Distress.  No: Oriented (Nonverbal)


HEENT: Pupils Equal, Pupils Reactive, Mucous Membr. Moist/Pink


Neck: Supple, Trachea Midline.  No: Lymphadenopathy


Lungs: Normal Respiratory Effort, Crackles (Fine crackles noted posteriorly)


Cardiovascular: Regular Rate, Regular Rhythm, No Murmurs


GI/Abdominal Exam: Normal Bowel Sounds, Soft, Non-Tender, No Distention


 (Male) Exam: Deferred


Back Exam: Normal Inspection, Full Range of Motion


Extremities: Normal Inspection, Normal Range of Motion, Non-Tender, No Pedal 

Edema, Normal Capillary Refill


Peripheral Pulses: 2+: Radial (L), Radial (R), Dorsalis Pedis (L), Dorsalis 

Pedis (R)


Skin: Warm, Dry, Intact


Neurological: Other (Patient is nonverbal due to Lewy body dementia)


Psy/Mental Status: Alert, Normal Affect, Normal Mood





Sepsis Event Note





- Evaluation


Sepsis Screening Result: No Definite Risk





- Focused Exam


Vital Signs: 


                                   Vital Signs











  Temp Pulse Resp BP Pulse Ox Pulse Ox


 


 10/20/20 12:47       100


 


 10/20/20 12:11  97.2 F     


 


 10/20/20 12:04   61  34 H  134/91 H  95 


 


 10/20/20 08:00  97.5 F  59 L  22 H  108/69  96 


 


 10/20/20 07:55       95


 


 10/20/20 01:51  97.9 F  58 L  16  99/55 L  94 L 














- Problem List & Annotations


(1) Influenza B


SNOMED Code(s): 92797520


   Code(s): J10.1 - FLU DUE TO OTH IDENT INFLUENZA VIRUS W OTH RESP MANIFEST   

Status: Acute   Priority: High   Current Visit: Yes   





(2) Chronic congestive heart failure


SNOMED Code(s): 27078080


   Code(s): I50.9 - HEART FAILURE, UNSPECIFIED   Status: Chronic   Priority: 

Medium   Current Visit: Yes   


Qualifiers: 


   Heart failure type: diastolic   Qualified Code(s): I50.32 - Chronic diastolic

 (congestive) heart failure   





(3) Lewy body dementia


SNOMED Code(s): 262090316


   Code(s): G31.83 - DEMENTIA WITH LEWY BODIES; F02.80 - DEMENTIA IN OTH 

DISEASES CLASSD ELSWHR W/O BEHAVRL DISTURB   Status: Chronic   Priority: Medium 

  Current Visit: Yes   


Qualifiers: 


   Dementia behavioral disturbance: without behavioral disturbance   Qualified 

Code(s): G31.83 - Dementia with Lewy bodies; F02.80 - Dementia in other diseases

 classified elsewhere without behavioral disturbance   





(4) Pacemaker


SNOMED Code(s): 131905458


   Code(s): Z95.0 - PRESENCE OF CARDIAC PACEMAKER   Status: Chronic   Priority: 

Medium   Current Visit: Yes   





- Problem List Review


Problem List Initiated/Reviewed/Updated: Yes





- Assessment


Assessment:: 





I have seen and examined the patient independent of nurse practitioner Gabi Newby and I have discussed the case with her.  I have reviewed and agree with

 the assessment and plan as outlined for this patient by her.  Please see 

orders.





10/19/20


* positive for influenza B. Currently receiving tamiflu for this


* O2 titrated down


* more alert this afternoon, eating and drinking well per nursing staff


* Mg 1.7 today


* VSS  no fever


PLAN:


* Continue Tamiflu


* Continue IV fluids


* Magnesium 2 g IV today


* Continue nebulizer treatments


* Labs tomorrow


Tentative plan for patient to return to [facility] tomorrow











10/20/20


* positive for influenza B. Currently receiving tamiflu for this


* O2 titrated down


* Very alert.  Eating and drinking well.


* VSS  no fever


* Nursing staff reporting that patient coughing when taking p.o.


* The patient's lungs with increasing crackles this afternoon.  Nebulizer 

  treatment given per respiratory care.  Lung sounds remain wet sounding.


PLAN:


* Continue Tamiflu


* Discontinue IV fluids.


* Continue nebulizer treatments


* Swallow eval to be completed this afternoon.


* Patient is unable to return to the nursing home today due to the fact that a 

  swallow eval cannot be done until after 3 PM.  The nursing home will not take 

  patients after 3 PM.


* Give it 20 mg IV Lasix x1 dose.  Renal function is stable.


Tentative plan for patient to return to [facility] tomorrow








- Plan


Plan:: 





The patient is an 80-year-old gentleman who has been admitted as an inpatient to

 acute hospitalization.  The patient does have laboratory proven influenza B.  I

 started the patient on Tamiflu 30 mg p.o. twice daily to see if this helps with

 his symptomology.  The patient also is dry and he will be kept on IV fluids 

consisting of normal saline at 75 mL/h.  His fluids status will be monitored 

very closely in case of fluid overload.  He is currently taking Lasix as his 

home medication.  The patient also has severe dementia and as a result of this 

formation from the patient is scarce.  Last time the patient was in the hospital

 was in 2018.  I reviewed the patient's medications from home and I have 

restarted him on these medications.  The patient has also been taking Coumadin 

currently has a therapeutic INR and the patient will not need DVT prophylaxis.  

The patient appears to be bedridden however I am concerned that he is a fall 

risk and therefore will not have mechanical SCDs.  The patient will likely need 

to have a soft mechanical diet and I have ordered a speech eval to help 

determine if the patient does have any issues with aspiration.  Physical therapy

 and occupational therapy have also been ordered for the patient.  He should be 

appropriate for transfer back to his nursing home in 2 to 3 days.





Influenza B


-albuterol nebulizer


-tamiflu


-Saline lock IV





Pacemaker; Chronic congestive heart failure


-on lasix, metoprolol and coumadin





Lewy body dementia


-chronic progression





PROPHYLAXIS:


DVT: Coumadin





CODE STATUS: DNR\DNI





DISPOSITION:


Plan to discharge back to [facility] tomorrow.  











<Lazaro Hartley - Last Filed: 10/20/20 15:20>





- Patient Data


Vitals - Most Recent: 


                                Last Vital Signs











Temp  36.2 C   10/20/20 12:11


 


Pulse  61   10/20/20 14:05


 


Resp  34 H  10/20/20 12:04


 


BP  120/68   10/20/20 14:05


 


Pulse Ox  100   10/20/20 12:47











I&O - Last 24 Hours: 


                                 Intake & Output











 10/20/20 10/20/20 10/20/20





 06:59 14:59 22:59


 


Intake Total 1600 420 


 


Balance 1600 420 











Lab Results Last 24 Hours: 


                         Laboratory Results - last 24 hr











  10/20/20 10/20/20 Range/Units





  08:53 08:53 


 


WBC  6.22   (4.23-9.07)  K/mm3


 


RBC  4.01 L   (4.63-6.08)  M/mm3


 


Hgb  12.3 L   (13.7-17.5)  gm/dl


 


Hct  38.2 L   (40.1-51.0)  %


 


MCV  95.3 H   (79.0-92.2)  fl


 


MCH  30.7   (25.7-32.2)  pg


 


MCHC  32.2   (32.2-35.5)  g/dl


 


RDW Std Deviation  49.5 H   (35.1-43.9)  fL


 


Plt Count  157 L   (163-337)  K/mm3


 


MPV  8.8 L   (9.4-12.3)  fl


 


Neut % (Auto)  71.1 H   (34.0-67.9)  %


 


Lymph % (Auto)  13.7 L   (21.8-53.1)  %


 


Mono % (Auto)  12.4 H   (5.3-12.2)  %


 


Eos % (Auto)  2.4   (0.8-7.0)  


 


Baso % (Auto)  0.2   (0.1-1.2)  %


 


Neut # (Auto)  4.43   (1.78-5.38)  K/mm3


 


Lymph # (Auto)  0.85 L   (1.32-3.57)  K/mm3


 


Mono # (Auto)  0.77   (0.30-0.82)  K/mm3


 


Eos # (Auto)  0.15   (0.04-0.54)  K/mm3


 


Baso # (Auto)  0.01   (0.01-0.08)  K/mm3


 


Sodium   142  (136-145)  mEq/L


 


Potassium   3.6  (3.5-5.1)  mEq/L


 


Chloride   106  ()  mEq/L


 


Carbon Dioxide   30  (21-32)  mEq/L


 


Anion Gap   9.6  (5-15)  


 


BUN   16  (7-18)  mg/dL


 


Creatinine   0.9  (0.7-1.3)  mg/dL


 


Est Cr Clr Drug Dosing   61.22  mL/min


 


Estimated GFR (MDRD)   > 60  (>60)  mL/min


 


BUN/Creatinine Ratio   17.8  (14-18)  


 


Glucose   91  ()  mg/dL


 


Calcium   8.5  (8.5-10.1)  mg/dL


 


Magnesium   1.9  (1.8-2.4)  mg/dl


 


Total Bilirubin   1.5 H  (0.2-1.0)  mg/dL


 


AST   32  (15-37)  U/L


 


ALT   33  (16-63)  U/L


 


Alkaline Phosphatase   142 H  ()  U/L


 


Total Protein   6.2 L  (6.4-8.2)  g/dl


 


Albumin   2.6 L  (3.4-5.0)  g/dl


 


Globulin   3.6  gm/dL


 


Albumin/Globulin Ratio   0.7 L  (1-2)  











Jose Results Last 24 Hours: 


                                  Microbiology











 10/18/20 10:32 Aerobic Blood Culture - Preliminary





 Blood - Venous - Lab Draw    NO GROWTH AFTER 2 DAYS





 Anaerobic Blood Culture - Preliminary





    NO GROWTH AFTER 2 DAYS


 


 10/18/20 10:25 Aerobic Blood Culture - Preliminary





 Blood - Venous    NO GROWTH AFTER 2 DAYS





 Anaerobic Blood Culture - Preliminary





    NO GROWTH AFTER 2 DAYS











Med Orders - Current: 


                               Current Medications





Acetaminophen (Tylenol)  650 mg RECTAL Q4H PRN


   PRN Reason: Pain (mild 1-3)


   Last Admin: 10/19/20 20:38 Dose:  650 mg


   Documented by: 


Albuterol/Ipratropium (Duoneb 3.0-0.5 Mg/3 Ml)  3 ml NEB Q4H PRN


   PRN Reason: Shortness Of Breath/wheezing


   Last Admin: 10/20/20 12:47 Dose:  3 ml


   Documented by: 


Allopurinol (Zyloprim)  50 mg PO DAILY [facility]


   Last Admin: 10/20/20 09:01 Dose:  50 mg


   Documented by: 


Artificial Tears (Refresh Liquigel 1%)  0 ml EYEBOTH BID PRN


   PRN Reason: Dry Eyes


Bisacodyl (Dulcolax)  10 mg RECTAL DAILY PRN


   PRN Reason: Constipation


Furosemide (Lasix)  60 mg PO DAILY [facility]


   Last Admin: 10/20/20 09:00 Dose:  60 mg


   Documented by: 


Ketorolac Tromethamine (Toradol)  30 mg IM Q6H PRN


   PRN Reason: Pain (moderate 4-6)


Levothyroxine Sodium (Synthroid)  50 mcg PO ACBREAKFAST [facility]


   Last Admin: 10/20/20 09:01 Dose:  50 mcg


   Documented by: 


Magnesium Hydroxide (Milk Of Magnesia)  30 ml PO TID PRN


   PRN Reason: Constipation


Metoprolol Tartrate (Lopressor)  25 mg PO DAILY [facility]


   Last Admin: 10/20/20 14:05 Dose:  25 mg


   Documented by: 


Ondansetron HCl (Zofran)  4 mg IV Q6H PRN


   PRN Reason: Nausea/Vomiting


Oseltamivir Phosphate (Tamiflu)  30 mg PO BID [facility]


   Last Admin: 10/20/20 09:19 Dose:  30 mg


   Documented by: 


Trolamine Salicylate (Aspercreme 10%)  0 gm TOP BID [facility]


   Last Admin: 10/20/20 09:02 Dose:  1 applic


   Documented by: 


Warfarin Sodium (Coumadin)  7.5 mg PO SuTuThSa@1800 [facility]


   Last Admin: 10/18/20 18:17 Dose:  7.5 mg


   Documented by: 


Warfarin Sodium (Coumadin)  10 mg PO MoWeFr@1800 [facility]


   Last Admin: 10/19/20 18:51 Dose:  10 mg


   Documented by: 





Discontinued Medications





Albuterol/Ipratropium (Duoneb 3.0-0.5 Mg/3 Ml)  3 ml NEB ONETIME ONE


   Stop: 10/18/20 10:05


   Last Admin: 10/18/20 10:20 Dose:  3 ml


   Documented by: 


Albuterol/Ipratropium (Duoneb 3.0-0.5 Mg/3 Ml)  3 ml NEB ONETIME ONE


   Stop: 10/18/20 14:29


   Last Admin: 10/18/20 14:36 Dose:  3 ml


   Documented by: 


Furosemide (Lasix)  20 mg IVPUSH NOW ONE


   Stop: 10/20/20 13:01


   Last Admin: 10/20/20 14:34 Dose:  20 mg


   Documented by: 


Sodium Chloride (Normal Saline)  1,000 mls @ 75 mls/hr IV ASDIRECTED [facility]


   Last Admin: 10/20/20 06:36 Dose:  75 mls/hr


   Documented by: 


Magnesium Sulfate (Magnesium Sulfate In Water Premix)  2 gm in 50 mls @ 25 

mls/hr IV ONETIME ONE


   Stop: 10/19/20 10:03


   Last Admin: 10/19/20 09:51 Dose:  25 mls/hr


   Documented by: 


Lorazepam (Ativan)  0.5 mg IVPUSH ONETIME ONE


   Stop: 10/18/20 10:25


   Last Admin: 10/18/20 10:48 Dose:  0.5 mg


   Documented by: 


Morphine Sulfate (Morphine)  1 mg IVPUSH ONETIME ONE


   Stop: 10/18/20 10:25


   Last Admin: 10/18/20 10:48 Dose:  1 mg


   Documented by: 


Non-Formulary Medication (Brimonidine Tartrate [Lumify])  1 drop EYEBOTH DAILY 

PRN


   PRN Reason: redness


Oseltamivir Phosphate (Tamiflu)  30 mg PO ONETIME ONE


   Stop: 10/18/20 14:14


   Last Admin: 10/18/20 14:50 Dose:  30 mg


   Documented by: 


Oseltamivir Phosphate (Tamiflu)  30 mg PO BID [facility]


   Last Admin: 10/20/20 09:16 Dose:  Not Given


   Documented by: 


Warfarin Sodium (Coumadin)  10 mg PO MoWeFr@1800 [facility]


   Last Admin: 10/19/20 19:37 Dose:  Not Given


   Documented by: 











Sepsis Event Note





- Focused Exam


Vital Signs: 


                                   Vital Signs











  Temp Pulse Resp BP Pulse Ox Pulse Ox


 


 10/20/20 14:05   61   120/68  


 


 10/20/20 12:47       100


 


 10/20/20 12:11  36.2 C     


 


 10/20/20 12:04   61  34 H  134/91 H  95 


 


 10/20/20 08:00  36.4 C  59 L  22 H  108/69  96 


 


 10/20/20 07:55       95














- Problem List & Annotations


(1) Influenza B


SNOMED Code(s): 27808004


   Code(s): J10.1 - FLU DUE TO OTH IDENT INFLUENZA VIRUS W OTH RESP MANIFEST   

Status: Acute   Priority: High   Current Visit: Yes   





(2) Pacemaker


SNOMED Code(s): 748914519


   Code(s): Z95.0 - PRESENCE OF CARDIAC PACEMAKER   Status: Chronic   Priority: 

Medium   Current Visit: Yes   





(3) Chronic congestive heart failure


SNOMED Code(s): 78124314


   Code(s): I50.9 - HEART FAILURE, UNSPECIFIED   Status: Chronic   Priority: 

Medium   Current Visit: Yes   


Qualifiers: 


   Heart failure type: diastolic   Qualified Code(s): I50.32 - Chronic diastolic

 (congestive) heart failure   





(4) Lewy body dementia


SNOMED Code(s): 868388154


   Code(s): G31.83 - DEMENTIA WITH LEWY BODIES; F02.80 - DEMENTIA IN OTH 

DISEASES CLASSD ELSWHR W/O BEHAVRL DISTURB   Status: Chronic   Priority: Medium 

  Current Visit: Yes   


Qualifiers: 


   Dementia behavioral disturbance: without behavioral disturbance   Qualified 

Code(s): G31.83 - Dementia with Lewy bodies; F02.80 - Dementia in other diseases

 classified elsewhere without behavioral disturbance   





- My Orders


Last 24 Hours: 


My Active Orders





10/19/20 Dinner


Mechanical Soft Diet [DIET] 





10/19/20 18:00


Warfarin [Coumadin]   10 mg PO MoWeFr@1800 





10/20/20 09:30


Oseltamivir [Tamiflu]   30 mg PO BID 














- Plan


Plan:: 


I have seen and examined the patient independent of nurse practitioner Gabi Newby and I have discussed the case with her.  I have reviewed and agree with

 the assessment and plan as outlined for this patient by her.  Please see 

orders.

## 2020-10-21 NOTE — [id number].DCSUM1
<Gabi Newby M - Last Filed: 10/21/20 13:32>





**Discharge Summary





- Hospital Course


HPI Initial Comments: 





The patient is an 80-year-old gentleman who had presented to the emergency 

department from nursing home with decreased alertness and a cough.  The patient 

has advanced Lewy body dementia and he has been unable to participate in any 

meaningful way with his history and physical.  The patient is essentially 

nonverbal.  Information has been obtained from the charting as well as previous 

medical records.  Patient's family is not available.  Apparently over the past 

24 to 36 hours the patient became less responsive.  He has a cough.  The patient

does have a history of congestive heart failure and he is currently 

anticoagulated chronically with warfarin with a therapeutic INR.  The patient 

also has multiple medications for constipation.  Patient also has a pacemaker in

place.


Diagnosis: Stroke: No





- Discharge Data


Discharge Date: 10/21/20 (Admit date: 10/18/20)


Discharge Disposition: DC/Tfer to SNF [facility]


Condition: Stable





- Referral to Home Health


Primary Care Physician: 


Eliezer Moran MD








- Discharge Diagnosis/Problem(s)


(1) Influenza B


SNOMED Code(s): 91151176


   ICD Code: J10.1 - FLU DUE TO OTH IDENT INFLUENZA VIRUS W OTH RESP MANIFEST   

Status: Acute   Priority: High   





(2) Chronic congestive heart failure


SNOMED Code(s): 33721668


   ICD Code: I50.9 - HEART FAILURE, UNSPECIFIED   Status: Chronic   Priority: 

Medium   


Qualifiers: 


   Heart failure type: diastolic   Qualified Code(s): I50.32 - Chronic diastolic

(congestive) heart failure   





(3) Lewy body dementia


SNOMED Code(s): 747256700


   ICD Code: G31.83 - DEMENTIA WITH LEWY BODIES; F02.80 - DEMENTIA IN OTH 

DISEASES CLASSD ELSWHR W/O BEHAVRL DISTURB   Status: Chronic   Priority: Medium 

 


Qualifiers: 


   Dementia behavioral disturbance: without behavioral disturbance   Qualified 

Code(s): G31.83 - Dementia with Lewy bodies; F02.80 - Dementia in other diseases

classified elsewhere without behavioral disturbance   





(4) Pacemaker


SNOMED Code(s): 717436587


   ICD Code: Z95.0 - PRESENCE OF CARDIAC PACEMAKER   Status: Chronic   Priority:

Medium   





- Patient Summary/Data


Consults: 


                                  Consultations





10/18/20 15:11


OT Evaluation and Treatment [CONS] Routine 


PT Evaluation and Treatment [CONS] Routine 


SLP Evaluation and Treatment [CONS] Routine 











Hospital Course: 





10/19/20


* positive for influenza B. Currently receiving tamiflu for this


* O2 titrated down


* more alert this afternoon, eating and drinking well per nursing staff


* Mg 1.7 today


* VSS  no fever


PLAN:


* Continue Tamiflu


* Continue IV fluids


* Magnesium 2 g IV today


* Continue nebulizer treatments


* Labs tomorrow


Tentative plan for patient to return to [facility] tomorrow











10/20/20


* positive for influenza B. Currently receiving tamiflu for this


* O2 titrated down


* Very alert.  Eating and drinking well.


* VSS  no fever


* Nursing staff reporting that patient coughing when taking p.o.


* The patient's lungs with increasing crackles this afternoon.  Nebulizer 

  treatment given per respiratory care.  Lung sounds remain wet sounding.


PLAN:


* Continue Tamiflu


* Discontinue IV fluids.


* Continue nebulizer treatments


* Swallow eval to be completed this afternoon.


* Patient is unable to return to the nursing home today due to the fact that a 

  swallow eval cannot be done until after 3 PM.  The nursing home will not take 

  patients after 3 PM.


* Give it 20 mg IV Lasix x1 dose.  Renal function is stable.


Tentative plan for patient to return to [facility] tomorrow





10/21/20


* Patient to be discharged to the nursing home today.


* Continue taking Tamiflu suspension twice daily with the last dose being Friday

   morning.


* Patient is to follow-up with his primary care provider in 1 week


* Nursing home to follow speech therapies swallow eval recommendations.


* Magnesium is 1.7 today.


* Magnesium 2 g IV given prior to discharge to nursing home.











- Patient Instructions


Diet: Usual Diet as Tolerated


Activity: As Tolerated


Other/Special Instructions: Patient may be discharged back to nursing home.  

Follow-up with primary care physician in 1 week.  Continue taking Tamiflu 5 mg 

twice a day.  Last dose will be Friday morning dose.  Tamiflu suspension has 

been sent with the patient from the hospital.  No prescription needed.  Please 

follow new recommendations regarding patient's swallowing eval and dietary 

needs.





- Discharge Plan


*PRESCRIPTION DRUG MONITORING PROGRAM REVIEWED*: Not Applicable


*COPY OF PRESCRIPTION DRUG MONITORING REPORT IN PATIENT MARISA: Not Applicable


Home Medications: 


                                    Home Meds





Furosemide 60 mg PO DAILY 08/24/18 [History]


Levothyroxine [Synthroid] 50 mcg PO ACBREAKFAST 08/24/18 [History]


allopurinoL [Zyloprim] 50 mg PO DAILY 08/24/18 [History]


Warfarin Sodium [Jantoven] 7.5 mg PO SUTUTHSA 08/27/18 [History]


Acetaminophen [Pain Reliever] 650 mg PO DAILY PRN 10/19/18 [History]


Sennosides/Docusate Sodium [Senna Plus Tablet] 1 tab PO DAILY PRN 10/19/18 

[History]


Warfarin Sodium [Coumadin] 10 mg PO MOWEFR 10/19/18 [History]


Brimonidine Tartrate [Lumify] 1 drop EYEBOTH DAILY PRN 10/18/20 [History]


Magnesium Hydroxide [Milk of Magnesia] 30 ml PO TID PRN 10/18/20 [History]


Metoprolol Tartrate [Lopressor] 25 mg PO DAILY 10/18/20 [History]


Propylene Glycol//Pf [Systane 0.3-0.4% Eye Drop] 1 drop EYEBOTH BID PRN 

10/18/20 [History]


Sennosides [Senna] 8.6 mg PO BID 10/18/20 [History]


Trolamine Salicylate/Aloe Vera [Aspercreme 10%] 1 applic TOP BID 10/18/20 

[History]


bisacodyL [Bisacodyl] 10 mg RECTAL DAILY PRN 10/18/20 [History]


polyethylene glycoL 3350 [MiraLAX] 17 gm PO DAILY 10/18/20 [History]


Oseltamivir [Tamiflu] 30 mg PO BID  bottle 10/21/20 [Rx]








Oxygen Therapy Mode: Room Air


Patient Handouts:  Heart Failure Action Plan


Referrals: 


Eliezer Moran MD [Primary Care Provider] - 10/30/20 8:30 am (Please follow up

with Dr. Moran on October 30 at 8:30.)





- Discharge Summary/Plan Comment


DC Time >30 min.: Yes





- General Info


Date of Service: 10/21/20


Admission Dx/Problem (Free Text: 


Patient is from nursing home with reduced alertness and cough.


Subjective Update: 





Doing very well today.  Patient can be transferred back to the nursing home.


Functional Status: Reports: Pain Controlled, Tolerating Diet, Urinating





- Review of Systems


General: Reports: No Symptoms


HEENT: Reports: No Symptoms


Pulmonary: Reports: No Symptoms


Cardiovascular: Reports: No Symptoms


Gastrointestinal: Reports: No Symptoms


Genitourinary: Reports: Incontinence


Musculoskeletal: Reports: No Symptoms


Skin: Reports: No Symptoms


Neurological: Reports: Other (Patient is nonverbal)


Psychiatric: Reports: Other (Patient is nonverbal due to advanced dementia)





- Patient Data


Vitals - Most Recent: 


                                Last Vital Signs











Temp  98.8 F   10/21/20 07:48


 


Pulse  59 L  10/21/20 07:48


 


Resp  16   10/21/20 07:48


 


BP  104/57 L  10/21/20 07:48


 


Pulse Ox  91 L  10/21/20 10:02











Weight - Most Recent: 76.249 kg


I&O - Last 24 hours: 


                                 Intake & Output











 10/20/20 10/21/20 10/21/20





 22:59 06:59 14:59


 


Intake Total 470 30 240


 


Balance 470 30 240











Lab Results - Last 24 hrs: 


                         Laboratory Results - last 24 hr











  10/20/20 10/21/20 10/21/20 Range/Units





  17:52 04:36 04:36 


 


WBC   7.51   (4.23-9.07)  K/mm3


 


RBC   4.06 L   (4.63-6.08)  M/mm3


 


Hgb   12.4 L   (13.7-17.5)  gm/dl


 


Hct   38.9 L   (40.1-51.0)  %


 


MCV   95.8 H   (79.0-92.2)  fl


 


MCH   30.5   (25.7-32.2)  pg


 


MCHC   31.9 L   (32.2-35.5)  g/dl


 


RDW Std Deviation   49.7 H   (35.1-43.9)  fL


 


Plt Count   176   (163-337)  K/mm3


 


MPV   9.6   (9.4-12.3)  fl


 


Neut % (Auto)   73.2 H   (34.0-67.9)  %


 


Lymph % (Auto)   12.6 L   (21.8-53.1)  %


 


Mono % (Auto)   13.2 H   (5.3-12.2)  %


 


Eos % (Auto)   0.8   (0.8-7.0)  


 


Baso % (Auto)   0.1   (0.1-1.2)  %


 


Neut # (Auto)   5.49 H   (1.78-5.38)  K/mm3


 


Lymph # (Auto)   0.95 L   (1.32-3.57)  K/mm3


 


Mono # (Auto)   0.99 H   (0.30-0.82)  K/mm3


 


Eos # (Auto)   0.06   (0.04-0.54)  K/mm3


 


Baso # (Auto)   0.01   (0.01-0.08)  K/mm3


 


Sodium    140  (136-145)  mEq/L


 


Potassium    3.8  (3.5-5.1)  mEq/L


 


Chloride    104  ()  mEq/L


 


Carbon Dioxide    30  (21-32)  mEq/L


 


Anion Gap    9.8  (5-15)  


 


BUN    17  (7-18)  mg/dL


 


Creatinine    1.1  (0.7-1.3)  mg/dL


 


Est Cr Clr Drug Dosing    50.09  mL/min


 


Estimated GFR (MDRD)    > 60  (>60)  mL/min


 


BUN/Creatinine Ratio    15.5  (14-18)  


 


Glucose    98  ()  mg/dL


 


POC Glucose  105    ()  mg/dL


 


Calcium    8.5  (8.5-10.1)  mg/dL


 


Magnesium    1.7 L  (1.8-2.4)  mg/dl


 


Total Bilirubin    2.0 H  (0.2-1.0)  mg/dL


 


AST    58 H  (15-37)  U/L


 


ALT    56  (16-63)  U/L


 


Alkaline Phosphatase    211 H  ()  U/L


 


Total Protein    6.7  (6.4-8.2)  g/dl


 


Albumin    2.8 L  (3.4-5.0)  g/dl


 


Globulin    3.9  gm/dL


 


Albumin/Globulin Ratio    0.7 L  (1-2)  


 


SARS-CoV-2 RNA (QUINN)     (NEGATIVE)  














  10/21/20 Range/Units





  09:20 


 


WBC   (4.23-9.07)  K/mm3


 


RBC   (4.63-6.08)  M/mm3


 


Hgb   (13.7-17.5)  gm/dl


 


Hct   (40.1-51.0)  %


 


MCV   (79.0-92.2)  fl


 


MCH   (25.7-32.2)  pg


 


MCHC   (32.2-35.5)  g/dl


 


RDW Std Deviation   (35.1-43.9)  fL


 


Plt Count   (163-337)  K/mm3


 


MPV   (9.4-12.3)  fl


 


Neut % (Auto)   (34.0-67.9)  %


 


Lymph % (Auto)   (21.8-53.1)  %


 


Mono % (Auto)   (5.3-12.2)  %


 


Eos % (Auto)   (0.8-7.0)  


 


Baso % (Auto)   (0.1-1.2)  %


 


Neut # (Auto)   (1.78-5.38)  K/mm3


 


Lymph # (Auto)   (1.32-3.57)  K/mm3


 


Mono # (Auto)   (0.30-0.82)  K/mm3


 


Eos # (Auto)   (0.04-0.54)  K/mm3


 


Baso # (Auto)   (0.01-0.08)  K/mm3


 


Sodium   (136-145)  mEq/L


 


Potassium   (3.5-5.1)  mEq/L


 


Chloride   ()  mEq/L


 


Carbon Dioxide   (21-32)  mEq/L


 


Anion Gap   (5-15)  


 


BUN   (7-18)  mg/dL


 


Creatinine   (0.7-1.3)  mg/dL


 


Est Cr Clr Drug Dosing   mL/min


 


Estimated GFR (MDRD)   (>60)  mL/min


 


BUN/Creatinine Ratio   (14-18)  


 


Glucose   ()  mg/dL


 


POC Glucose   ()  mg/dL


 


Calcium   (8.5-10.1)  mg/dL


 


Magnesium   (1.8-2.4)  mg/dl


 


Total Bilirubin   (0.2-1.0)  mg/dL


 


AST   (15-37)  U/L


 


ALT   (16-63)  U/L


 


Alkaline Phosphatase   ()  U/L


 


Total Protein   (6.4-8.2)  g/dl


 


Albumin   (3.4-5.0)  g/dl


 


Globulin   gm/dL


 


Albumin/Globulin Ratio   (1-2)  


 


SARS-CoV-2 RNA (QUINN)  Negative  (NEGATIVE)  











CATHERINE Results - Last 24 hrs: 


                                  Microbiology











 10/18/20 10:32 Aerobic Blood Culture - Preliminary





 Blood - Venous - Lab Draw    NO GROWTH AFTER 3 DAYS





 Anaerobic Blood Culture - Preliminary





    NO GROWTH AFTER 3 DAYS


 


 10/18/20 10:25 Aerobic Blood Culture - Preliminary





 Blood - Venous    NO GROWTH AFTER 3 DAYS





 Anaerobic Blood Culture - Preliminary





    NO GROWTH AFTER 3 DAYS











Med Orders - Current: 


                               Current Medications





Acetaminophen (Tylenol)  650 mg RECTAL Q4H PRN


   PRN Reason: Pain (mild 1-3)


   Last Admin: 10/19/20 20:38 Dose:  650 mg


   Documented by: 


Albuterol/Ipratropium (Duoneb 3.0-0.5 Mg/3 Ml)  3 ml NEB Q4H PRN


   PRN Reason: Shortness Of Breath/wheezing


   Last Admin: 10/20/20 21:33 Dose:  3 ml


   Documented by: 


Allopurinol (Zyloprim)  50 mg PO DAILY [facility]


   Last Admin: 10/21/20 09:24 Dose:  50 mg


   Documented by: 


Artificial Tears (Refresh Liquigel 1%)  0 ml EYEBOTH BID PRN


   PRN Reason: Dry Eyes


Bisacodyl (Dulcolax)  10 mg RECTAL DAILY PRN


   PRN Reason: Constipation


Furosemide (Lasix)  60 mg PO DAILY [facility]


   Last Admin: 10/21/20 09:25 Dose:  60 mg


   Documented by: 


Magnesium Sulfate (Magnesium Sulfate In Water Premix)  2 gm in 50 mls @ 25 

mls/hr IV Q1H [facility]


   Last Admin: 10/21/20 12:58 Dose:  Not Given


   Documented by: 


Ketorolac Tromethamine (Toradol)  30 mg IM Q6H PRN


   PRN Reason: Pain (moderate 4-6)


Levothyroxine Sodium (Synthroid)  50 mcg PO ACBREAKFAST [facility]


   Last Admin: 10/21/20 06:59 Dose:  Not Given


   Documented by: 


Magnesium Hydroxide (Milk Of Magnesia)  30 ml PO TID PRN


   PRN Reason: Constipation


Metoprolol Tartrate (Lopressor)  25 mg PO DAILY [facility]


   Last Admin: 10/21/20 09:26 Dose:  Not Given


   Documented by: 


Ondansetron HCl (Zofran)  4 mg IV Q6H PRN


   PRN Reason: Nausea/Vomiting


Oseltamivir Phosphate (Tamiflu)  30 mg PO BID [facility]


   Last Admin: 10/21/20 09:23 Dose:  30 mg


   Documented by: 


Trolamine Salicylate (Aspercreme 10%)  0 gm TOP BID [facility]


   Last Admin: 10/21/20 09:33 Dose:  1 applic


   Documented by: 


Warfarin Sodium (Coumadin)  7.5 mg PO SuTuThSa@1800 [facility]


   Last Admin: 10/20/20 17:53 Dose:  Not Given


   Documented by: 


Warfarin Sodium (Coumadin)  10 mg PO MoWeFr@1800 [facility]


   Last Admin: 10/19/20 18:51 Dose:  10 mg


   Documented by: 





Discontinued Medications





Albuterol/Ipratropium (Duoneb 3.0-0.5 Mg/3 Ml)  3 ml NEB ONETIME ONE


   Stop: 10/18/20 10:05


   Last Admin: 10/18/20 10:20 Dose:  3 ml


   Documented by: 


Albuterol/Ipratropium (Duoneb 3.0-0.5 Mg/3 Ml)  3 ml NEB ONETIME ONE


   Stop: 10/18/20 14:29


   Last Admin: 10/18/20 14:36 Dose:  3 ml


   Documented by: 


Furosemide (Lasix)  20 mg IVPUSH NOW ONE


   Stop: 10/20/20 13:01


   Last Admin: 10/20/20 14:34 Dose:  20 mg


   Documented by: 


Sodium Chloride (Normal Saline)  1,000 mls [facility] 75 [facility]


   Last Admin: 10/20/20 06:36 Dose:  75 mls/hr


   Documented by: 


Magnesium Sulfate (Magnesium Sulfate In Water Premix)  2 gm in 50 mls @ 25 

mls/hr IV ONETIME ONE


   Stop: 10/19/20 10:03


   Last Admin: 10/19/20 09:51 Dose:  25 mls/hr


   Documented by: 


Lorazepam (Ativan)  0.5 mg IVPUSH ONETIME ONE


   Stop: 10/18/20 10:25


   Last Admin: 10/18/20 10:48 Dose:  0.5 mg


   Documented by: 


Morphine Sulfate (Morphine)  1 mg IVPUSH ONETIME ONE


   Stop: 10/18/20 10:25


   Last Admin: 10/18/20 10:48 Dose:  1 mg


   Documented by: 


Non-Formulary Medication (Brimonidine Tartrate [Lumify])  1 drop EYEBOTH DAILY 

PRN


   PRN Reason: redness


Oseltamivir Phosphate (Tamiflu)  30 mg PO ONETIME ONE


   Stop: 10/18/20 14:14


   Last Admin: 10/18/20 14:50 Dose:  30 mg


   Documented by: 


Oseltamivir Phosphate (Tamiflu)  30 mg PO BID [facility]


   Last Admin: 10/20/20 09:16 Dose:  Not Given


   Documented by: 


Warfarin Sodium (Coumadin)  10 mg PO MoWeFr@1800 [facility]


   Last Admin: 10/19/20 19:37 Dose:  Not Given


   Documented by: 











- Exam


Quality Assessment: Denies: Supplemental Oxygen


General: Reports: Alert, Cooperative, No Acute Distress.  Denies: Oriented 

(Patient is nonverbal due to advanced dementia)


HEENT: Reports: Pupils Equal, Mucous Membr. Moist/Pink


Neck: Reports: Supple, Trachea Midline.  Denies: Lymphadenopathy


Lungs: Reports: Crackles (Crackles noted to bilateral bases.)


Cardiovascular: Reports: Regular Rate, Regular Rhythm


GI/Abdominal Exam: Normal Bowel Sounds, Soft, Non-Tender, No Distention


 (Male) Exam: Deferred


Rectal (Males) Exam: Deferred


Back Exam: Reports: Normal Inspection, Full Range of Motion


Extremities: Normal Inspection, Normal Range of Motion, Non-Tender, No Pedal 

Edema, Normal Capillary Refill


Skin: Reports: Warm, Dry, Intact


Neurological: Reports: No New Focal Deficit


Psy/Mental Status: Reports: Alert, Normal Affect, Normal Mood





*Q Meaningful Use (DIS)





- VTE *Q


VTE Mechanical Contraindications *Q: At Risk for Falls


VTE Pharmacological Contraindications *Q: Risk of Bleeding


VTE Anticoagulation Contraindications: Treatment Not Tolerated





<Lazaro Hartley - Last Filed: 10/21/20 17:54>





**Discharge Summary





- Referral to Home Health


Primary Care Physician: 


Eliezer Moran MD








- Discharge Diagnosis/Problem(s)


(1) Influenza B


SNOMED Code(s): 52054696


   ICD Code: J10.1 - FLU DUE TO OTH IDENT INFLUENZA VIRUS W OTH RESP MANIFEST   

Status: Acute   Priority: High   





(2) Pacemaker


SNOMED Code(s): 717481824


   ICD Code: Z95.0 - PRESENCE OF CARDIAC PACEMAKER   Status: Chronic   Priority:

 Medium   





(3) Chronic congestive heart failure


SNOMED Code(s): 78693314


   ICD Code: I50.9 - HEART FAILURE, UNSPECIFIED   Status: Chronic   Priority: 

Medium   


Qualifiers: 


   Heart failure type: diastolic   Qualified Code(s): I50.32 - Chronic diastolic

 (congestive) heart failure   





(4) Lewy body dementia


SNOMED Code(s): 751304811


   ICD Code: G31.83 - DEMENTIA WITH LEWY BODIES; F02.80 - DEMENTIA IN OTH 

DISEASES CLASSD ELSWHR W/O BEHAVRL DISTURB   Status: Chronic   Priority: Medium 

  


Qualifiers: 


   Dementia behavioral disturbance: without behavioral disturbance   Qualified 

Code(s): G31.83 - Dementia with Lewy bodies; F02.80 - Dementia in other diseases

 classified elsewhere without behavioral disturbance   





- Patient Summary/Data


Consults: 


                                  Consultations





10/18/20 15:11


OT Evaluation and Treatment [CONS] Routine 


PT Evaluation and Treatment [CONS] Routine 


SLP Evaluation and Treatment [CONS] Routine 











Hospital Course: 





I have seen and examined the patient independent of nurse practitioner Gabi Newby and I have discussed the case with her.  I have reviewed and agree with

 the assessment and plan as outlined for this patient by her.  Please see 

orders.








- Patient Data


Vitals - Most Recent: 


                                Last Vital Signs











Temp  37.1 C   10/21/20 07:48


 


Pulse  59 L  10/21/20 07:48


 


Resp  16   10/21/20 07:48


 


BP  104/57 L  10/21/20 07:48


 


Pulse Ox  91 L  10/21/20 10:02











I&O - Last 24 hours: 


                                 Intake & Output











 10/21/20 10/21/20 10/21/20





 06:59 14:59 22:59


 


Intake Total 30 240 


 


Balance 30 240 











Lab Results - Last 24 hrs: 


                         Laboratory Results - last 24 hr











  10/20/20 10/21/20 10/21/20 Range/Units





  17:52 04:36 04:36 


 


WBC   7.51   (4.23-9.07)  K/mm3


 


RBC   4.06 L   (4.63-6.08)  M/mm3


 


Hgb   12.4 L   (13.7-17.5)  gm/dl


 


Hct   38.9 L   (40.1-51.0)  %


 


MCV   95.8 H   (79.0-92.2)  fl


 


MCH   30.5   (25.7-32.2)  pg


 


MCHC   31.9 L   (32.2-35.5)  g/dl


 


RDW Std Deviation   49.7 H   (35.1-43.9)  fL


 


Plt Count   176   (163-337)  K/mm3


 


MPV   9.6   (9.4-12.3)  fl


 


Neut % (Auto)   73.2 H   (34.0-67.9)  %


 


Lymph % (Auto)   12.6 L   (21.8-53.1)  %


 


Mono % (Auto)   13.2 H   (5.3-12.2)  %


 


Eos % (Auto)   0.8   (0.8-7.0)  


 


Baso % (Auto)   0.1   (0.1-1.2)  %


 


Neut # (Auto)   5.49 H   (1.78-5.38)  K/mm3


 


Lymph # (Auto)   0.95 L   (1.32-3.57)  K/mm3


 


Mono # (Auto)   0.99 H   (0.30-0.82)  K/mm3


 


Eos # (Auto)   0.06   (0.04-0.54)  K/mm3


 


Baso # (Auto)   0.01   (0.01-0.08)  K/mm3


 


Sodium    140  (136-145)  mEq/L


 


Potassium    3.8  (3.5-5.1)  mEq/L


 


Chloride    104  ()  mEq/L


 


Carbon Dioxide    30  (21-32)  mEq/L


 


Anion Gap    9.8  (5-15)  


 


BUN    17  (7-18)  mg/dL


 


Creatinine    1.1  (0.7-1.3)  mg/dL


 


Est Cr Clr Drug Dosing    50.09  mL/min


 


Estimated GFR (MDRD)    > 60  (>60)  mL/min


 


BUN/Creatinine Ratio    15.5  (14-18)  


 


Glucose    98  ()  mg/dL


 


POC Glucose  105    ()  mg/dL


 


Calcium    8.5  (8.5-10.1)  mg/dL


 


Magnesium    1.7 L  (1.8-2.4)  mg/dl


 


Total Bilirubin    2.0 H  (0.2-1.0)  mg/dL


 


AST    58 H  (15-37)  U/L


 


ALT    56  (16-63)  U/L


 


Alkaline Phosphatase    211 H  ()  U/L


 


Total Protein    6.7  (6.4-8.2)  g/dl


 


Albumin    2.8 L  (3.4-5.0)  g/dl


 


Globulin    3.9  gm/dL


 


Albumin/Globulin Ratio    0.7 L  (1-2)  


 


SARS-CoV-2 RNA (QUINN)     (NEGATIVE)  














  10/21/20 Range/Units





  09:20 


 


WBC   (4.23-9.07)  K/mm3


 


RBC   (4.63-6.08)  M/mm3


 


Hgb   (13.7-17.5)  gm/dl


 


Hct   (40.1-51.0)  %


 


MCV   (79.0-92.2)  fl


 


MCH   (25.7-32.2)  pg


 


MCHC   (32.2-35.5)  g/dl


 


RDW Std Deviation   (35.1-43.9)  fL


 


Plt Count   (163-337)  K/mm3


 


MPV   (9.4-12.3)  fl


 


Neut % (Auto)   (34.0-67.9)  %


 


Lymph % (Auto)   (21.8-53.1)  %


 


Mono % (Auto)   (5.3-12.2)  %


 


Eos % (Auto)   (0.8-7.0)  


 


Baso % (Auto)   (0.1-1.2)  %


 


Neut # (Auto)   (1.78-5.38)  K/mm3


 


Lymph # (Auto)   (1.32-3.57)  K/mm3


 


Mono # (Auto)   (0.30-0.82)  K/mm3


 


Eos # (Auto)   (0.04-0.54)  K/mm3


 


Baso # (Auto)   (0.01-0.08)  K/mm3


 


Sodium   (136-145)  mEq/L


 


Potassium   (3.5-5.1)  mEq/L


 


Chloride   ()  mEq/L


 


Carbon Dioxide   (21-32)  mEq/L


 


Anion Gap   (5-15)  


 


BUN   (7-18)  mg/dL


 


Creatinine   (0.7-1.3)  mg/dL


 


Est Cr Clr Drug Dosing   mL/min


 


Estimated GFR (MDRD)   (>60)  mL/min


 


BUN/Creatinine Ratio   (14-18)  


 


Glucose   ()  mg/dL


 


POC Glucose   ()  mg/dL


 


Calcium   (8.5-10.1)  mg/dL


 


Magnesium   (1.8-2.4)  mg/dl


 


Total Bilirubin   (0.2-1.0)  mg/dL


 


AST   (15-37)  U/L


 


ALT   (16-63)  U/L


 


Alkaline Phosphatase   ()  U/L


 


Total Protein   (6.4-8.2)  g/dl


 


Albumin   (3.4-5.0)  g/dl


 


Globulin   gm/dL


 


Albumin/Globulin Ratio   (1-2)  


 


SARS-CoV-2 RNA (QUINN)  Negative  (NEGATIVE)  











CATHERINE Results - Last 24 hrs: 


                                  Microbiology











 10/18/20 10:32 Aerobic Blood Culture - Preliminary





 Blood - Venous - Lab Draw    NO GROWTH AFTER 3 DAYS





 Anaerobic Blood Culture - Preliminary





    NO GROWTH AFTER 3 DAYS


 


 10/18/20 10:25 Aerobic Blood Culture - Preliminary





 Blood - Venous    NO GROWTH AFTER 3 DAYS





 Anaerobic Blood Culture - Preliminary





    NO GROWTH AFTER 3 DAYS











Med Orders - Current: 


                               Current Medications








Discontinued Medications





Acetaminophen (Tylenol)  650 mg RECTAL Q4H PRN


   PRN Reason: Pain (mild 1-3)


   Last Admin: 10/19/20 20:38 Dose:  650 mg


   Documented by: 


Albuterol/Ipratropium (Duoneb 3.0-0.5 Mg/3 Ml)  3 ml NEB ONETIME ONE


   Stop: 10/18/20 10:05


   Last Admin: 10/18/20 10:20 Dose:  3 ml


   Documented by: 


Albuterol/Ipratropium (Duoneb 3.0-0.5 Mg/3 Ml)  3 ml NEB ONETIME ONE


   Stop: 10/18/20 14:29


   Last Admin: 10/18/20 14:36 Dose:  3 ml


   Documented by: 


Albuterol/Ipratropium (Duoneb 3.0-0.5 Mg/3 Ml)  3 ml NEB Q4H PRN


   PRN Reason: Shortness Of Breath/wheezing


   Last Admin: 10/20/20 21:33 Dose:  3 ml


   Documented by: 


Allopurinol (Zyloprim)  50 mg PO DAILY [facility]


   Last Admin: 10/21/20 09:24 Dose:  50 mg


   Documented by: 


Artificial Tears (Refresh Liquigel 1%)  0 ml EYEBOTH BID PRN


   PRN Reason: Dry Eyes


Bisacodyl (Dulcolax)  10 mg RECTAL DAILY PRN


   PRN Reason: Constipation


Furosemide (Lasix)  60 mg PO DAILY [facility]


   Last Admin: 10/21/20 09:25 Dose:  60 mg


   Documented by: 


Furosemide (Lasix)  20 mg IVPUSH NOW ONE


   Stop: 10/20/20 13:01


   Last Admin: 10/20/20 14:34 Dose:  20 mg


   Documented by: 


Sodium Chloride (Normal Saline)  1,000 mls @ 75 mls/hr IV ASDIRECTED [facility]


   Last Admin: 10/20/20 06:36 Dose:  75 mls/hr


   Documented by: 


Magnesium Sulfate (Magnesium Sulfate In Water Premix)  2 gm in 50 mls @ 25 

mls/hr IV ONETIME ONE


   Stop: 10/19/20 10:03


   Last Admin: 10/19/20 09:51 Dose:  25 mls/hr


   Documented by: 


Magnesium Sulfate (Magnesium Sulfate In Water Premix)  2 gm in 50 mls @ 25 

mls/hr IV Q1H [facility]


   Last Admin: 10/21/20 12:58 Dose:  Not Given


   Documented by: 


Ketorolac Tromethamine (Toradol)  30 mg IM Q6H PRN


   PRN Reason: Pain (moderate 4-6)


Levothyroxine Sodium (Synthroid)  50 mcg PO ACBREAKFAST [facility]


   Last Admin: 10/21/20 06:59 Dose:  Not Given


   Documented by: 


Lorazepam (Ativan)  0.5 mg IVPUSH ONETIME ONE


   Stop: 10/18/20 10:25


   Last Admin: 10/18/20 10:48 Dose:  0.5 mg


   Documented by: 


Magnesium Hydroxide (Milk Of Magnesia)  30 ml PO TID PRN


   PRN Reason: Constipation


Metoprolol Tartrate (Lopressor)  25 mg PO DAILY [facility]


   Last Admin: 10/21/20 09:26 Dose:  Not Given


   Documented by: 


Morphine Sulfate (Morphine)  1 mg IVPUSH ONETIME ONE


   Stop: 10/18/20 10:25


   Last Admin: 10/18/20 10:48 Dose:  1 mg


   Documented by: 


Non-Formulary Medication (Brimonidine Tartrate [Lumify])  1 drop EYEBOTH DAILY 

PRN


   PRN Reason: redness


Ondansetron HCl (Zofran)  4 mg IV Q6H PRN


   PRN Reason: Nausea/Vomiting


Oseltamivir Phosphate (Tamiflu)  30 mg PO ONETIME ONE


   Stop: 10/18/20 14:14


   Last Admin: 10/18/20 14:50 Dose:  30 mg


   Documented by: 


Oseltamivir Phosphate (Tamiflu)  30 mg PO BID [facility]


   Last Admin: 10/20/20 09:16 Dose:  Not Given


   Documented by: 


Oseltamivir Phosphate (Tamiflu)  30 mg PO BID [facility]


   Last Admin: 10/21/20 09:23 Dose:  30 mg


   Documented by: 


Trolamine Salicylate (Aspercreme 10%)  0 gm TOP BID [facility]


   Last Admin: 10/21/20 09:33 Dose:  1 applic


   Documented by: 


Warfarin Sodium (Coumadin)  10 mg PO [email or web address]@1800 [facility]


   Last Admin: 10/19/20 19:37 Dose:  Not Given


   Documented by: 


Warfarin Sodium (Coumadin)  7.5 mg PO [email or web address]@1800 [facility]


   Last Admin: 10/20/20 17:53 Dose:  Not Given


   Documented by: 


Warfarin Sodium (Coumadin)  10 mg PO [email or web address]@1800 [facility]


   Last Admin: 10/19/20 18:51 Dose:  10 mg


   Documented by:

## 2021-03-26 NOTE — CT
CT abdomen and pelvis

 

Technique: Multiple axial sections were obtained from above the dome 

of the diaphragm inferiorly through the pubic symphysis.  Intravenous 

contrast was utilized.  Small amount of oral contrast was given.  

Delayed images were also obtained through the abdomen and pelvis.

 

Comparison: Prior CT abdomen of 05/01/12 is available.

 

Findings: Visualized lung bases show nothing acute.  Liver shows no 

focal parenchymal abnormality.  Air is identified within the 

intrahepatic and extrahepatic biliary tree.  Spleen appears within 

normal limits.  Gallbladder not seen compatible with previous 

cholecystectomy.  Pancreas is within normal limits.  Common bile duct 

is slightly prominent in size most likely residual from prior surgery.

 

Adrenal gland on the left side is slightly prominent which appears as 

a stable finding from previous exam and is therefore incidental.  

Aorta shows atherosclerotic change without aneurysmal dilatation.  No 

retroperitoneal adenopathy is seen.  Cortical scar is noted within the

 left kidney which is stable.  Small cortical lesion is seen within 

the left kidney compatible with small cyst which is stable.  

Parapelvic cyst is noted within the left kidney which is slightly 

increased in size from previous exam.  Small cyst is noted within the 

upper pole of the right kidney.

 

No mesenteric abnormalities are seen.  No pelvic mass or adenopathy is

 seen.  Equivocal prominence of the rectal wall is seen and difficult 

to exclude a mild proctitis.  Please correlate with the patient's 

symptoms.  Bladder wall is slightly prominent most likely due to under

 distention.  Compression deformity noted of L3 which appears to be 

old.  Lesser compression deformity of L2 which is also old.

 

Impression:

1.  Questionable thickening of the rectal wall.  Please correlate if 

patient has any symptoms to suggest proctitis.

2.  Other incidental findings as noted above.  Nothing acute is 

otherwise seen.

 

Diagnostic code #3

 

Agree with preliminary report issued by [employer] (report 

finalized on 08/28/18, 6:30 PM Central Time) I have personally performed a face to face diagnostic evaluation on this patient. I have reviewed the ACP note and agree with the history, exam and plan of care, except as noted. Attending with